# Patient Record
Sex: MALE | Race: WHITE | NOT HISPANIC OR LATINO | Employment: OTHER | URBAN - METROPOLITAN AREA
[De-identification: names, ages, dates, MRNs, and addresses within clinical notes are randomized per-mention and may not be internally consistent; named-entity substitution may affect disease eponyms.]

---

## 2017-01-19 ENCOUNTER — APPOINTMENT (OUTPATIENT)
Dept: LAB | Age: 67
End: 2017-01-19
Payer: COMMERCIAL

## 2017-01-19 ENCOUNTER — TRANSCRIBE ORDERS (OUTPATIENT)
Dept: ADMINISTRATIVE | Age: 67
End: 2017-01-19

## 2017-01-19 DIAGNOSIS — I42.9 SECONDARY CARDIOMYOPATHY, UNSPECIFIED: Primary | ICD-10-CM

## 2017-01-19 DIAGNOSIS — I42.9 SECONDARY CARDIOMYOPATHY, UNSPECIFIED: ICD-10-CM

## 2017-01-19 LAB
ANION GAP SERPL CALCULATED.3IONS-SCNC: 6 MMOL/L (ref 4–13)
BUN SERPL-MCNC: 21 MG/DL (ref 5–25)
CALCIUM SERPL-MCNC: 8.7 MG/DL (ref 8.3–10.1)
CHLORIDE SERPL-SCNC: 107 MMOL/L (ref 100–108)
CO2 SERPL-SCNC: 28 MMOL/L (ref 21–32)
CREAT SERPL-MCNC: 1 MG/DL (ref 0.6–1.3)
GFR SERPL CREATININE-BSD FRML MDRD: >60 ML/MIN/1.73SQ M
GLUCOSE SERPL-MCNC: 102 MG/DL (ref 65–140)
POTASSIUM SERPL-SCNC: 4.1 MMOL/L (ref 3.5–5.3)
SODIUM SERPL-SCNC: 141 MMOL/L (ref 136–145)

## 2017-01-19 PROCEDURE — 36415 COLL VENOUS BLD VENIPUNCTURE: CPT

## 2017-01-19 PROCEDURE — 80048 BASIC METABOLIC PNL TOTAL CA: CPT

## 2017-03-07 ENCOUNTER — GENERIC CONVERSION - ENCOUNTER (OUTPATIENT)
Dept: OTHER | Facility: OTHER | Age: 67
End: 2017-03-07

## 2017-05-22 ENCOUNTER — GENERIC CONVERSION - ENCOUNTER (OUTPATIENT)
Dept: OTHER | Facility: OTHER | Age: 67
End: 2017-05-22

## 2017-05-23 ENCOUNTER — TRANSCRIBE ORDERS (OUTPATIENT)
Dept: ADMINISTRATIVE | Age: 67
End: 2017-05-23

## 2017-05-23 ENCOUNTER — APPOINTMENT (OUTPATIENT)
Dept: LAB | Age: 67
End: 2017-05-23
Payer: COMMERCIAL

## 2017-05-23 DIAGNOSIS — E55.9 UNSPECIFIED VITAMIN D DEFICIENCY: ICD-10-CM

## 2017-05-23 DIAGNOSIS — Z51.81 ENCOUNTER FOR THERAPEUTIC DRUG MONITORING: ICD-10-CM

## 2017-05-23 DIAGNOSIS — Z51.81 ENCOUNTER FOR THERAPEUTIC DRUG MONITORING: Primary | ICD-10-CM

## 2017-05-23 DIAGNOSIS — Z79.899 ENCOUNTER FOR LONG-TERM (CURRENT) USE OF OTHER MEDICATIONS: ICD-10-CM

## 2017-05-23 LAB
25(OH)D3 SERPL-MCNC: 34.9 NG/ML (ref 30–100)
ALBUMIN SERPL BCP-MCNC: 3.8 G/DL (ref 3.5–5)
ALP SERPL-CCNC: 54 U/L (ref 46–116)
ALT SERPL W P-5'-P-CCNC: 23 U/L (ref 12–78)
ANION GAP SERPL CALCULATED.3IONS-SCNC: 6 MMOL/L (ref 4–13)
AST SERPL W P-5'-P-CCNC: 15 U/L (ref 5–45)
BACTERIA UR QL AUTO: NORMAL /HPF
BILIRUB SERPL-MCNC: 1.22 MG/DL (ref 0.2–1)
BILIRUB UR QL STRIP: NEGATIVE
BUN SERPL-MCNC: 18 MG/DL (ref 5–25)
CALCIUM SERPL-MCNC: 9.1 MG/DL (ref 8.3–10.1)
CHLORIDE SERPL-SCNC: 106 MMOL/L (ref 100–108)
CLARITY UR: CLEAR
CO2 SERPL-SCNC: 27 MMOL/L (ref 21–32)
COLOR UR: YELLOW
CREAT SERPL-MCNC: 0.98 MG/DL (ref 0.6–1.3)
DIGOXIN SERPL-MCNC: 0.5 NG/ML (ref 0.8–2)
ERYTHROCYTE [DISTWIDTH] IN BLOOD BY AUTOMATED COUNT: 12.8 % (ref 11.6–15.1)
GFR SERPL CREATININE-BSD FRML MDRD: >60 ML/MIN/1.73SQ M
GLUCOSE P FAST SERPL-MCNC: 92 MG/DL (ref 65–99)
GLUCOSE UR STRIP-MCNC: NEGATIVE MG/DL
HCT VFR BLD AUTO: 42.8 % (ref 36.5–49.3)
HGB BLD-MCNC: 14.3 G/DL (ref 12–17)
HGB UR QL STRIP.AUTO: NEGATIVE
KETONES UR STRIP-MCNC: NEGATIVE MG/DL
LEUKOCYTE ESTERASE UR QL STRIP: NEGATIVE
MAGNESIUM SERPL-MCNC: 2.4 MG/DL (ref 1.6–2.6)
MCH RBC QN AUTO: 30.4 PG (ref 26.8–34.3)
MCHC RBC AUTO-ENTMCNC: 33.4 G/DL (ref 31.4–37.4)
MCV RBC AUTO: 91 FL (ref 82–98)
NITRITE UR QL STRIP: NEGATIVE
NON-SQ EPI CELLS URNS QL MICRO: NORMAL /HPF
PH UR STRIP.AUTO: 6 [PH] (ref 4.5–8)
PLATELET # BLD AUTO: 298 THOUSANDS/UL (ref 149–390)
PMV BLD AUTO: 10.3 FL (ref 8.9–12.7)
POTASSIUM SERPL-SCNC: 4.1 MMOL/L (ref 3.5–5.3)
PROT SERPL-MCNC: 7.5 G/DL (ref 6.4–8.2)
PROT UR STRIP-MCNC: NEGATIVE MG/DL
RBC # BLD AUTO: 4.71 MILLION/UL (ref 3.88–5.62)
RBC #/AREA URNS AUTO: NORMAL /HPF
SODIUM SERPL-SCNC: 139 MMOL/L (ref 136–145)
SP GR UR STRIP.AUTO: 1.02 (ref 1–1.03)
UROBILINOGEN UR QL STRIP.AUTO: 0.2 E.U./DL
WBC # BLD AUTO: 6.95 THOUSAND/UL (ref 4.31–10.16)
WBC #/AREA URNS AUTO: NORMAL /HPF

## 2017-05-23 PROCEDURE — 80162 ASSAY OF DIGOXIN TOTAL: CPT

## 2017-05-23 PROCEDURE — 82306 VITAMIN D 25 HYDROXY: CPT

## 2017-05-23 PROCEDURE — 80053 COMPREHEN METABOLIC PANEL: CPT

## 2017-05-23 PROCEDURE — 81001 URINALYSIS AUTO W/SCOPE: CPT | Performed by: INTERNAL MEDICINE

## 2017-05-23 PROCEDURE — 85027 COMPLETE CBC AUTOMATED: CPT

## 2017-05-23 PROCEDURE — 83735 ASSAY OF MAGNESIUM: CPT

## 2017-05-23 PROCEDURE — 36415 COLL VENOUS BLD VENIPUNCTURE: CPT

## 2017-06-21 ENCOUNTER — TRANSCRIBE ORDERS (OUTPATIENT)
Dept: ADMINISTRATIVE | Facility: HOSPITAL | Age: 67
End: 2017-06-21

## 2017-06-21 ENCOUNTER — ALLSCRIPTS OFFICE VISIT (OUTPATIENT)
Dept: OTHER | Facility: OTHER | Age: 67
End: 2017-06-21

## 2017-06-21 DIAGNOSIS — I49.3 VENTRICULAR PREMATURE DEPOLARIZATION: ICD-10-CM

## 2017-06-21 DIAGNOSIS — I50.9 HEART FAILURE, UNSPECIFIED (HCC): Primary | ICD-10-CM

## 2017-06-21 DIAGNOSIS — I50.9 HEART FAILURE (HCC): ICD-10-CM

## 2017-06-21 DIAGNOSIS — I49.3 VENTRICULAR PREMATURE BEATS: ICD-10-CM

## 2017-06-21 DIAGNOSIS — R00.2 PALPITATIONS: ICD-10-CM

## 2017-06-21 DIAGNOSIS — I47.2 PAROXYSMAL VENTRICULAR TACHYCARDIA (HCC): ICD-10-CM

## 2017-06-21 DIAGNOSIS — I47.2 VENTRICULAR TACHYCARDIA (HCC): ICD-10-CM

## 2017-06-26 DIAGNOSIS — I49.3 VENTRICULAR PREMATURE DEPOLARIZATION: ICD-10-CM

## 2017-06-26 DIAGNOSIS — I10 ESSENTIAL (PRIMARY) HYPERTENSION: ICD-10-CM

## 2017-06-26 DIAGNOSIS — I50.9 HEART FAILURE (HCC): ICD-10-CM

## 2017-06-30 ENCOUNTER — APPOINTMENT (OUTPATIENT)
Dept: LAB | Age: 67
End: 2017-06-30
Payer: COMMERCIAL

## 2017-06-30 ENCOUNTER — TRANSCRIBE ORDERS (OUTPATIENT)
Dept: ADMINISTRATIVE | Age: 67
End: 2017-06-30

## 2017-06-30 DIAGNOSIS — I49.3 VENTRICULAR PREMATURE DEPOLARIZATION: ICD-10-CM

## 2017-06-30 DIAGNOSIS — I50.9 HEART FAILURE (HCC): ICD-10-CM

## 2017-06-30 DIAGNOSIS — I10 ESSENTIAL (PRIMARY) HYPERTENSION: ICD-10-CM

## 2017-06-30 LAB
BUN SERPL-MCNC: 19 MG/DL (ref 5–25)
CREAT SERPL-MCNC: 0.97 MG/DL (ref 0.6–1.3)
GFR SERPL CREATININE-BSD FRML MDRD: >60 ML/MIN/1.73SQ M

## 2017-06-30 PROCEDURE — 36415 COLL VENOUS BLD VENIPUNCTURE: CPT

## 2017-06-30 PROCEDURE — 82565 ASSAY OF CREATININE: CPT

## 2017-06-30 PROCEDURE — 84520 ASSAY OF UREA NITROGEN: CPT

## 2017-07-17 ENCOUNTER — HOSPITAL ENCOUNTER (OUTPATIENT)
Dept: MRI IMAGING | Facility: HOSPITAL | Age: 67
Discharge: HOME | End: 2017-07-17
Attending: INTERNAL MEDICINE
Payer: COMMERCIAL

## 2017-07-17 DIAGNOSIS — R00.2 PALPITATIONS: ICD-10-CM

## 2017-07-17 DIAGNOSIS — I47.2 VENTRICULAR TACHYCARDIA (HCC): ICD-10-CM

## 2017-07-17 DIAGNOSIS — I49.3 VENTRICULAR PREMATURE DEPOLARIZATION: ICD-10-CM

## 2017-07-17 DIAGNOSIS — I50.9 HEART FAILURE (HCC): ICD-10-CM

## 2017-07-17 PROCEDURE — 75561 CARDIAC MRI FOR MORPH W/DYE: CPT

## 2017-07-17 PROCEDURE — A9577 INJ MULTIHANCE: HCPCS | Performed by: INTERNAL MEDICINE

## 2017-07-17 RX ADMIN — GADOBENATE DIMEGLUMINE 15 ML: 529 INJECTION, SOLUTION INTRAVENOUS at 09:40

## 2017-08-03 ENCOUNTER — ALLSCRIPTS OFFICE VISIT (OUTPATIENT)
Dept: OTHER | Facility: OTHER | Age: 67
End: 2017-08-03

## 2018-01-14 VITALS
BODY MASS INDEX: 24.41 KG/M2 | HEIGHT: 72 IN | DIASTOLIC BLOOD PRESSURE: 70 MMHG | WEIGHT: 180.25 LBS | SYSTOLIC BLOOD PRESSURE: 138 MMHG | HEART RATE: 69 BPM

## 2018-01-14 VITALS
WEIGHT: 183.06 LBS | SYSTOLIC BLOOD PRESSURE: 116 MMHG | HEART RATE: 62 BPM | BODY MASS INDEX: 24.79 KG/M2 | DIASTOLIC BLOOD PRESSURE: 68 MMHG | HEIGHT: 72 IN

## 2018-02-12 ENCOUNTER — APPOINTMENT (OUTPATIENT)
Dept: LAB | Age: 68
End: 2018-02-12
Payer: COMMERCIAL

## 2018-02-12 ENCOUNTER — TRANSCRIBE ORDERS (OUTPATIENT)
Dept: ADMINISTRATIVE | Age: 68
End: 2018-02-12

## 2018-02-12 DIAGNOSIS — J45.909 ASTHMATIC BRONCHITIS WITHOUT COMPLICATION, UNSPECIFIED ASTHMA SEVERITY, UNSPECIFIED WHETHER PERSISTENT: ICD-10-CM

## 2018-02-12 DIAGNOSIS — I42.9 FAMILIAL CARDIOMYOPATHY (HCC): Primary | ICD-10-CM

## 2018-02-12 DIAGNOSIS — I42.9 FAMILIAL CARDIOMYOPATHY (HCC): ICD-10-CM

## 2018-02-12 LAB
25(OH)D3 SERPL-MCNC: 32.4 NG/ML (ref 30–100)
ALBUMIN SERPL BCP-MCNC: 4.2 G/DL (ref 3.5–5)
ALP SERPL-CCNC: 48 U/L (ref 46–116)
ALT SERPL W P-5'-P-CCNC: 20 U/L (ref 12–78)
ANION GAP SERPL CALCULATED.3IONS-SCNC: 4 MMOL/L (ref 4–13)
AST SERPL W P-5'-P-CCNC: 14 U/L (ref 5–45)
BILIRUB SERPL-MCNC: 1.13 MG/DL (ref 0.2–1)
BUN SERPL-MCNC: 22 MG/DL (ref 5–25)
CALCIUM SERPL-MCNC: 9.1 MG/DL (ref 8.3–10.1)
CHLORIDE SERPL-SCNC: 102 MMOL/L (ref 100–108)
CO2 SERPL-SCNC: 31 MMOL/L (ref 21–32)
CREAT SERPL-MCNC: 0.88 MG/DL (ref 0.6–1.3)
ERYTHROCYTE [DISTWIDTH] IN BLOOD BY AUTOMATED COUNT: 12.6 % (ref 11.6–15.1)
GFR SERPL CREATININE-BSD FRML MDRD: 90 ML/MIN/1.73SQ M
GLUCOSE P FAST SERPL-MCNC: 88 MG/DL (ref 65–99)
HCT VFR BLD AUTO: 42.9 % (ref 36.5–49.3)
HGB BLD-MCNC: 14.7 G/DL (ref 12–17)
MCH RBC QN AUTO: 31 PG (ref 26.8–34.3)
MCHC RBC AUTO-ENTMCNC: 34.3 G/DL (ref 31.4–37.4)
MCV RBC AUTO: 91 FL (ref 82–98)
PLATELET # BLD AUTO: 278 THOUSANDS/UL (ref 149–390)
PMV BLD AUTO: 9.5 FL (ref 8.9–12.7)
POTASSIUM SERPL-SCNC: 4.3 MMOL/L (ref 3.5–5.3)
PROT SERPL-MCNC: 7.8 G/DL (ref 6.4–8.2)
RBC # BLD AUTO: 4.74 MILLION/UL (ref 3.88–5.62)
SODIUM SERPL-SCNC: 137 MMOL/L (ref 136–145)
WBC # BLD AUTO: 6.66 THOUSAND/UL (ref 4.31–10.16)

## 2018-02-12 PROCEDURE — 80053 COMPREHEN METABOLIC PANEL: CPT

## 2018-02-12 PROCEDURE — 85027 COMPLETE CBC AUTOMATED: CPT

## 2018-02-12 PROCEDURE — 82306 VITAMIN D 25 HYDROXY: CPT

## 2018-02-12 PROCEDURE — 36415 COLL VENOUS BLD VENIPUNCTURE: CPT

## 2018-03-12 ENCOUNTER — APPOINTMENT (OUTPATIENT)
Dept: LAB | Age: 68
End: 2018-03-12
Payer: COMMERCIAL

## 2018-03-12 ENCOUNTER — TRANSCRIBE ORDERS (OUTPATIENT)
Dept: ADMINISTRATIVE | Age: 68
End: 2018-03-12

## 2018-03-12 DIAGNOSIS — I42.9 FAMILIAL CARDIOMYOPATHY (HCC): ICD-10-CM

## 2018-03-12 DIAGNOSIS — I49.3 VENTRICULAR PREMATURE BEATS: Primary | ICD-10-CM

## 2018-03-12 DIAGNOSIS — I49.3 VENTRICULAR PREMATURE BEATS: ICD-10-CM

## 2018-03-12 LAB
ANION GAP SERPL CALCULATED.3IONS-SCNC: 7 MMOL/L (ref 4–13)
BUN SERPL-MCNC: 23 MG/DL (ref 5–25)
CALCIUM SERPL-MCNC: 9 MG/DL (ref 8.3–10.1)
CHLORIDE SERPL-SCNC: 104 MMOL/L (ref 100–108)
CO2 SERPL-SCNC: 29 MMOL/L (ref 21–32)
CREAT SERPL-MCNC: 0.96 MG/DL (ref 0.6–1.3)
GFR SERPL CREATININE-BSD FRML MDRD: 82 ML/MIN/1.73SQ M
GLUCOSE P FAST SERPL-MCNC: 92 MG/DL (ref 65–99)
POTASSIUM SERPL-SCNC: 4.2 MMOL/L (ref 3.5–5.3)
SODIUM SERPL-SCNC: 140 MMOL/L (ref 136–145)

## 2018-03-12 PROCEDURE — 80048 BASIC METABOLIC PNL TOTAL CA: CPT

## 2018-03-12 PROCEDURE — 36415 COLL VENOUS BLD VENIPUNCTURE: CPT

## 2018-09-19 RX ORDER — CALCIUM CARBONATE/VITAMIN D3 500-10/5ML
1 LIQUID (ML) ORAL DAILY
COMMUNITY

## 2018-09-25 ENCOUNTER — OFFICE VISIT (OUTPATIENT)
Dept: CARDIOLOGY CLINIC | Facility: CLINIC | Age: 68
End: 2018-09-25
Payer: MEDICARE

## 2018-09-25 VITALS
SYSTOLIC BLOOD PRESSURE: 106 MMHG | BODY MASS INDEX: 24.16 KG/M2 | WEIGHT: 178.4 LBS | HEART RATE: 63 BPM | DIASTOLIC BLOOD PRESSURE: 70 MMHG | HEIGHT: 72 IN

## 2018-09-25 DIAGNOSIS — I47.2 NSVT (NONSUSTAINED VENTRICULAR TACHYCARDIA) (HCC): Primary | ICD-10-CM

## 2018-09-25 PROCEDURE — 93000 ELECTROCARDIOGRAM COMPLETE: CPT | Performed by: INTERNAL MEDICINE

## 2018-09-25 PROCEDURE — 99213 OFFICE O/P EST LOW 20 MIN: CPT | Performed by: INTERNAL MEDICINE

## 2018-09-25 RX ORDER — ALBUTEROL SULFATE 90 UG/1
1 AEROSOL, METERED RESPIRATORY (INHALATION) EVERY 6 HOURS PRN
COMMUNITY

## 2018-09-25 RX ORDER — FLUTICASONE PROPIONATE 50 MCG
1 SPRAY, SUSPENSION (ML) NASAL DAILY PRN
COMMUNITY

## 2018-09-25 NOTE — PROGRESS NOTES
HEART AND VASCULAR  CARDIAC ELECTROPHYSIOLOGY   HEART RHYTHM CENTER  [facility]    Outpatient  Follow-up  Today's Date: 09/25/18        Patient name: Marcelino Harris  YOB: 1950  Sex: male         Chief Complaint: f/u PVC      ASSESSMENT:  Problem List Items Addressed This Visit     None      Visit Diagnoses     NSVT (nonsustained ventricular tachycardia) ([address] )    -  Primary    Relevant Orders    POCT ECG        80 yo male1) Nonischemic CM EF 15-40% w medical management including entresto and metoprolol 50mg daily  NYHAI-II  2) Frequent PVC1-6% slow NSVT up to 7 beats 150bpm on holter, burden multifocal  SOme appear from Outflow, others from Anterior mitral annulus  Had bigeminy during peak stress on treadmill and did 7 minutes before stopping due to shortness of breath correlating w ectopy  We d/c'd digoxin and increased Metoprolol to 50mg bid  He feels better on this  Repeat Holter showed PVC burden 6% but much less NSVT, longest 3 beats  Repeat stress test also showed much less ectopy and longest NSVT run 3 beats  Cardiac MRI showed EF 40% and no scar  He has started entresto also  PLAN:    1  Continue metoprolol 50mg bid  2  He has another echo and stress test from Dr Vlad Ramirez office we will get records of  Orders Placed This Encounter   Procedures    POCT ECG     Medications Discontinued During This Encounter   Medication Reason    Triamcinolone Acetonide (NASACORT ALLERGY 24HR) 55 MCG/ACT AERO     DIGOXIN PO     lisinopril (ZESTRIL) 20 mg tablet              HPI/Subjective: 80 yo male1) Nonischemic CM EF 15-40% w medical management including entresto and metoprolol 50mg daily  NYHAI-II  2) Frequent PVC1-6% slow NSVT up to 7 beats 150bpm on holter, burden multifocal  SOme appear from Outflow, others from Anterior mitral annulus   Had bigeminy during peak stress on treadmill and did 7 minutes before stopping due to shortness of breath correlating w ectopy  We d/c'd digoxin and increased Metoprolol to 50mg bid  He feels better on this  Repeat Holter showed PVC burden 6% but much less NSVT, longest 3 beats  Repeat stress test also showed much less ectopy and longest NSVT run 3 beats  Cardiac MRI showed EF 40% and no scar  He has started entresto also  Please note HPI is listed by problem with with update following it, it is copied again in the assessment above and reflects medical decision making as well  Complete 12 point ROS reviewed and otherwise non pertinent or negative except as per HPI pertinent positives in Cardiovascular and Respiratory emphasized  Please see paper chart for outpatient clinic patients where the patient completed the 12 point ROS survey  Past Medical History:   Diagnosis Date    Asthma     Heart failure ([facility] [address] )        No Known Allergies  I reviewed the Home Medication list and Allergies in the chart  Scheduled Meds:  Current Outpatient Prescriptions   Medication Sig Dispense Refill    albuterol (PROAIR HFA) 90 mcg/act inhaler 1 puff every 6 (six) hours as needed for wheezing      ascorbic acid (VITAMIN C) 500 mg tablet Take 500 mg by mouth daily   aspirin 81 MG tablet Take 81 mg by mouth daily   budesonide-formoterol (SYMBICORT) 160-4 5 mcg/act inhaler 1 puff daily        Cholecalciferol (VITAMIN D3) 1000 UNITS CAPS Take by mouth   Coenzyme Q10 (CO Q 10) 100 MG CAPS Take by mouth   fluticasone (FLONASE) 50 mcg/act nasal spray 1 spray into each nostril daily as needed for rhinitis      Magnesium Oxide 400 MG CAPS Take 1 capsule by mouth daily      metoprolol succinate (TOPROL-XL) 50 mg 24 hr tablet Take 50 mg by mouth 2 (two) times a day        montelukast (SINGULAIR) 10 mg tablet Take 10 mg by mouth daily at bedtime   Multiple Vitamin (MULTIVITAMIN) tablet Take 1 tablet by mouth daily        oxyCODONE-acetaminophen (PERCOCET) 5-325 mg per tablet Take 2 tablets by mouth every 4 (four) hours as needed for moderate pain for up to 20 doses  Max Daily Amount: 12 tablets 40 tablet 0    sacubitril-valsartan (ENTRESTO)  MG TABS Take 1 tablet by mouth 2 (two) times a day        spironolactone (ALDACTONE) 25 mg tablet Take 25 mg by mouth daily   thiamine (VITAMIN B1) 100 mg tablet Take 100 mg by mouth daily  No current facility-administered medications for this visit  PRN Meds:  No family history on file  Social History     Social History    Marital status: /Civil Union     Spouse name: N/A    Number of children: N/A    Years of education: N/A     Occupational History    Not on file  Social History Main Topics    Smoking status: Former Smoker    Smokeless tobacco: Never Used    Alcohol use No    Drug use: No    Sexual activity: Not on file     Other Topics Concern    Not on file     Social History Narrative    No narrative on file         OBJECTIVE:    /70 (BP Location: Left arm, Patient Position: Sitting, Cuff Size: Standard)   Pulse 63   Ht 6' (1 829 m)   Wt 80 9 kg (178 lb 6 4 oz)   BMI 24 20 kg/m²   Vitals:    09/25/18 1641   Weight: 80 9 kg (178 lb 6 4 oz)     GEN: No acute distress, Alert and oriented, well appearing  HEENT:Head, neck, ears, oral pharynx: Mucus membranes moist, oral pharynx clear, nares clear  External ears normal  EYES: Pupils equal, sclera anicteric, midline, normal conjuctiva  NECK: No JVD, supple, no obvious masses or thryomegaly or goiter  CARDIOVASCULAR:  RRR, No murmur, rub, gallops S1,S2  LUNGS: Clear To auscultation bilaterally, normal effort, no rales, rhonchi, crackles  ABDOMEN:  nondistended,  without obvious organomegaly or ascites  EXTREMITIES/VASCULAR:  No edema  Radial pulses intact, pedal pulses difficult to palpate, warm an well perfused    PSYCH: Normal Affect, no overt suicidal ideation, linear speech pattern without evidence of psychosis  NEURO: Grossly intact, moving all extremiteis equal, face symmetric, alert and responsive, no obvious focal defecits  GAIT:  Ambulates normally without difficulty  HEME: No bleeding, bruising, petechia, purpura  SKIN: No significant rashes, warm, no diaphoresis or pallor  Lab Results:       LABS:      Chemistry        Component Value Date/Time     03/12/2018 0643     04/24/2015 0714    K 4 2 03/12/2018 0643    K 3 8 04/24/2015 0714     03/12/2018 0643     04/24/2015 0714    CO2 29 03/12/2018 0643    CO2 32 04/24/2015 0714    BUN 23 03/12/2018 0643    BUN 21 04/24/2015 0714    CREATININE 0 96 03/12/2018 0643    CREATININE 0 74 04/24/2015 0714        Component Value Date/Time    CALCIUM 9 0 03/12/2018 0643    CALCIUM 9 0 04/24/2015 0714    ALKPHOS 48 02/12/2018 0651    ALKPHOS 76 04/18/2014 0720    AST 14 02/12/2018 0651    AST 16 04/18/2014 0720    ALT 20 02/12/2018 0651    ALT 23 04/18/2014 0720    BILITOT 0 92 04/18/2014 0720            Lab Results   Component Value Date    CHOL 173 04/18/2014     Lab Results   Component Value Date    HDL 43 03/03/2016    HDL 39 04/18/2014     Lab Results   Component Value Date    LDLCALC 117 (H) 03/03/2016    LDLCALC 114 (H) 04/18/2014     Lab Results   Component Value Date    TRIG 124 03/03/2016    TRIG 98 04/18/2014     No components found for: CHOLHDL    IMAGING: No results found  Cardiac testing:   Results for orders placed in visit on 08/02/18   Echo complete with contrast if indicated     No results found for this or any previous visit  No results found for this or any previous visit  No results found for this or any previous visit          I reviewed and interpreted the following LABS/EKG/TELE/IMAGING and below is summary of my interpretation (if data available):      Current EKG and Rhythm Strip: NSR, LAFB, septal infarct pattern QRS 118ms

## 2018-09-28 ENCOUNTER — APPOINTMENT (OUTPATIENT)
Dept: LAB | Age: 68
End: 2018-09-28
Payer: COMMERCIAL

## 2018-09-28 ENCOUNTER — TRANSCRIBE ORDERS (OUTPATIENT)
Dept: ADMINISTRATIVE | Age: 68
End: 2018-09-28

## 2018-09-28 DIAGNOSIS — I49.3 VENTRICULAR PREMATURE BEATS: Primary | ICD-10-CM

## 2018-09-28 DIAGNOSIS — I49.3 VENTRICULAR PREMATURE BEATS: ICD-10-CM

## 2018-09-28 LAB
25(OH)D3 SERPL-MCNC: 54.4 NG/ML (ref 30–100)
ALBUMIN SERPL BCP-MCNC: 3.6 G/DL (ref 3.5–5)
ALP SERPL-CCNC: 49 U/L (ref 46–116)
ALT SERPL W P-5'-P-CCNC: 20 U/L (ref 12–78)
ANION GAP SERPL CALCULATED.3IONS-SCNC: 4 MMOL/L (ref 4–13)
AST SERPL W P-5'-P-CCNC: 14 U/L (ref 5–45)
BACTERIA UR QL AUTO: NORMAL /HPF
BILIRUB SERPL-MCNC: 0.99 MG/DL (ref 0.2–1)
BILIRUB UR QL STRIP: NEGATIVE
BUN SERPL-MCNC: 22 MG/DL (ref 5–25)
CALCIUM SERPL-MCNC: 8.8 MG/DL (ref 8.3–10.1)
CHLORIDE SERPL-SCNC: 104 MMOL/L (ref 100–108)
CLARITY UR: CLEAR
CO2 SERPL-SCNC: 29 MMOL/L (ref 21–32)
COLOR UR: NORMAL
CREAT SERPL-MCNC: 0.95 MG/DL (ref 0.6–1.3)
ERYTHROCYTE [DISTWIDTH] IN BLOOD BY AUTOMATED COUNT: 12.8 % (ref 11.6–15.1)
GFR SERPL CREATININE-BSD FRML MDRD: 83 ML/MIN/1.73SQ M
GLUCOSE P FAST SERPL-MCNC: 86 MG/DL (ref 65–99)
GLUCOSE UR STRIP-MCNC: NEGATIVE MG/DL
HCT VFR BLD AUTO: 42.3 % (ref 36.5–49.3)
HGB BLD-MCNC: 13.9 G/DL (ref 12–17)
HGB UR QL STRIP.AUTO: NEGATIVE
KETONES UR STRIP-MCNC: NEGATIVE MG/DL
LDLC SERPL DIRECT ASSAY-MCNC: 115 MG/DL (ref 0–100)
LEUKOCYTE ESTERASE UR QL STRIP: NEGATIVE
MCH RBC QN AUTO: 30.8 PG (ref 26.8–34.3)
MCHC RBC AUTO-ENTMCNC: 32.9 G/DL (ref 31.4–37.4)
MCV RBC AUTO: 94 FL (ref 82–98)
NITRITE UR QL STRIP: NEGATIVE
NON-SQ EPI CELLS URNS QL MICRO: NORMAL /HPF
PH UR STRIP.AUTO: 7 [PH] (ref 4.5–8)
PLATELET # BLD AUTO: 315 THOUSANDS/UL (ref 149–390)
PMV BLD AUTO: 9.4 FL (ref 8.9–12.7)
POTASSIUM SERPL-SCNC: 4.6 MMOL/L (ref 3.5–5.3)
PROT SERPL-MCNC: 7.4 G/DL (ref 6.4–8.2)
PROT UR STRIP-MCNC: NEGATIVE MG/DL
RBC # BLD AUTO: 4.52 MILLION/UL (ref 3.88–5.62)
RBC #/AREA URNS AUTO: NORMAL /HPF
SODIUM SERPL-SCNC: 137 MMOL/L (ref 136–145)
SP GR UR STRIP.AUTO: 1.02 (ref 1–1.03)
TSH SERPL DL<=0.05 MIU/L-ACNC: 0.82 UIU/ML (ref 0.36–3.74)
UROBILINOGEN UR QL STRIP.AUTO: 0.2 E.U./DL
WBC # BLD AUTO: 7.05 THOUSAND/UL (ref 4.31–10.16)
WBC #/AREA URNS AUTO: NORMAL /HPF

## 2018-09-28 PROCEDURE — 84443 ASSAY THYROID STIM HORMONE: CPT

## 2018-09-28 PROCEDURE — 36415 COLL VENOUS BLD VENIPUNCTURE: CPT

## 2018-09-28 PROCEDURE — 82306 VITAMIN D 25 HYDROXY: CPT

## 2018-09-28 PROCEDURE — 81001 URINALYSIS AUTO W/SCOPE: CPT | Performed by: INTERNAL MEDICINE

## 2018-09-28 PROCEDURE — 80053 COMPREHEN METABOLIC PANEL: CPT

## 2018-09-28 PROCEDURE — 85027 COMPLETE CBC AUTOMATED: CPT

## 2018-09-28 PROCEDURE — 83721 ASSAY OF BLOOD LIPOPROTEIN: CPT

## 2019-05-03 ENCOUNTER — APPOINTMENT (OUTPATIENT)
Dept: LAB | Age: 69
End: 2019-05-03
Payer: COMMERCIAL

## 2019-05-03 ENCOUNTER — TRANSCRIBE ORDERS (OUTPATIENT)
Dept: ADMINISTRATIVE | Age: 69
End: 2019-05-03

## 2019-05-03 DIAGNOSIS — Z12.5 SPECIAL SCREENING FOR MALIGNANT NEOPLASM OF PROSTATE: ICD-10-CM

## 2019-05-03 DIAGNOSIS — I42.9 FAMILIAL CARDIOMYOPATHY (HCC): Primary | ICD-10-CM

## 2019-05-03 DIAGNOSIS — I42.9 FAMILIAL CARDIOMYOPATHY (HCC): ICD-10-CM

## 2019-05-03 LAB
ALBUMIN SERPL BCP-MCNC: 4.1 G/DL (ref 3.5–5)
ALP SERPL-CCNC: 52 U/L (ref 46–116)
ALT SERPL W P-5'-P-CCNC: 22 U/L (ref 12–78)
ANION GAP SERPL CALCULATED.3IONS-SCNC: 2 MMOL/L (ref 4–13)
AST SERPL W P-5'-P-CCNC: 12 U/L (ref 5–45)
BILIRUB SERPL-MCNC: 1.41 MG/DL (ref 0.2–1)
BUN SERPL-MCNC: 25 MG/DL (ref 5–25)
CALCIUM SERPL-MCNC: 9.2 MG/DL (ref 8.3–10.1)
CHLORIDE SERPL-SCNC: 104 MMOL/L (ref 100–108)
CO2 SERPL-SCNC: 30 MMOL/L (ref 21–32)
CREAT SERPL-MCNC: 1 MG/DL (ref 0.6–1.3)
GFR SERPL CREATININE-BSD FRML MDRD: 78 ML/MIN/1.73SQ M
GLUCOSE P FAST SERPL-MCNC: 95 MG/DL (ref 65–99)
MAGNESIUM SERPL-MCNC: 2.4 MG/DL (ref 1.6–2.6)
POTASSIUM SERPL-SCNC: 4.5 MMOL/L (ref 3.5–5.3)
PROT SERPL-MCNC: 8 G/DL (ref 6.4–8.2)
PSA SERPL-MCNC: 0.5 NG/ML (ref 0–4)
SODIUM SERPL-SCNC: 136 MMOL/L (ref 136–145)

## 2019-05-03 PROCEDURE — 80053 COMPREHEN METABOLIC PANEL: CPT

## 2019-05-03 PROCEDURE — G0103 PSA SCREENING: HCPCS

## 2019-05-03 PROCEDURE — 36415 COLL VENOUS BLD VENIPUNCTURE: CPT

## 2019-05-03 PROCEDURE — 83735 ASSAY OF MAGNESIUM: CPT

## 2020-02-17 ENCOUNTER — APPOINTMENT (OUTPATIENT)
Dept: LAB | Age: 70
End: 2020-02-17
Payer: COMMERCIAL

## 2020-02-17 ENCOUNTER — TRANSCRIBE ORDERS (OUTPATIENT)
Dept: ADMINISTRATIVE | Age: 70
End: 2020-02-17

## 2020-02-17 DIAGNOSIS — R35.1 NOCTURIA: ICD-10-CM

## 2020-02-17 DIAGNOSIS — R35.1 NOCTURIA: Primary | ICD-10-CM

## 2020-02-17 DIAGNOSIS — I49.3 VENTRICULAR PREMATURE BEATS: ICD-10-CM

## 2020-02-17 LAB
25(OH)D3 SERPL-MCNC: 44.2 NG/ML (ref 30–100)
ALBUMIN SERPL BCP-MCNC: 4 G/DL (ref 3.5–5)
ALP SERPL-CCNC: 50 U/L (ref 46–116)
ALT SERPL W P-5'-P-CCNC: 18 U/L (ref 12–78)
ANION GAP SERPL CALCULATED.3IONS-SCNC: 7 MMOL/L (ref 4–13)
AST SERPL W P-5'-P-CCNC: 12 U/L (ref 5–45)
BACTERIA UR QL AUTO: ABNORMAL /HPF
BASOPHILS # BLD AUTO: 0.08 THOUSANDS/ΜL (ref 0–0.1)
BASOPHILS NFR BLD AUTO: 1 % (ref 0–1)
BILIRUB SERPL-MCNC: 1.71 MG/DL (ref 0.2–1)
BILIRUB UR QL STRIP: NEGATIVE
BUN SERPL-MCNC: 29 MG/DL (ref 5–25)
CALCIUM SERPL-MCNC: 9.3 MG/DL (ref 8.3–10.1)
CHLORIDE SERPL-SCNC: 107 MMOL/L (ref 100–108)
CHOLEST SERPL-MCNC: 208 MG/DL (ref 50–200)
CLARITY UR: CLEAR
CO2 SERPL-SCNC: 27 MMOL/L (ref 21–32)
COLOR UR: YELLOW
CREAT SERPL-MCNC: 1 MG/DL (ref 0.6–1.3)
EOSINOPHIL # BLD AUTO: 0.25 THOUSAND/ΜL (ref 0–0.61)
EOSINOPHIL NFR BLD AUTO: 4 % (ref 0–6)
ERYTHROCYTE [DISTWIDTH] IN BLOOD BY AUTOMATED COUNT: 12.8 % (ref 11.6–15.1)
GFR SERPL CREATININE-BSD FRML MDRD: 77 ML/MIN/1.73SQ M
GLUCOSE P FAST SERPL-MCNC: 94 MG/DL (ref 65–99)
GLUCOSE UR STRIP-MCNC: NEGATIVE MG/DL
HCT VFR BLD AUTO: 43 % (ref 36.5–49.3)
HDLC SERPL-MCNC: 49 MG/DL
HGB BLD-MCNC: 14 G/DL (ref 12–17)
HGB UR QL STRIP.AUTO: NEGATIVE
IMM GRANULOCYTES # BLD AUTO: 0.01 THOUSAND/UL (ref 0–0.2)
IMM GRANULOCYTES NFR BLD AUTO: 0 % (ref 0–2)
KETONES UR STRIP-MCNC: NEGATIVE MG/DL
LDLC SERPL CALC-MCNC: 140 MG/DL (ref 0–100)
LEUKOCYTE ESTERASE UR QL STRIP: NEGATIVE
LYMPHOCYTES # BLD AUTO: 1.82 THOUSANDS/ΜL (ref 0.6–4.47)
LYMPHOCYTES NFR BLD AUTO: 31 % (ref 14–44)
MAGNESIUM SERPL-MCNC: 2.5 MG/DL (ref 1.6–2.6)
MCH RBC QN AUTO: 30.4 PG (ref 26.8–34.3)
MCHC RBC AUTO-ENTMCNC: 32.6 G/DL (ref 31.4–37.4)
MCV RBC AUTO: 93 FL (ref 82–98)
MONOCYTES # BLD AUTO: 0.75 THOUSAND/ΜL (ref 0.17–1.22)
MONOCYTES NFR BLD AUTO: 13 % (ref 4–12)
MUCOUS THREADS UR QL AUTO: ABNORMAL
NEUTROPHILS # BLD AUTO: 3 THOUSANDS/ΜL (ref 1.85–7.62)
NEUTS SEG NFR BLD AUTO: 51 % (ref 43–75)
NITRITE UR QL STRIP: NEGATIVE
NON-SQ EPI CELLS URNS QL MICRO: ABNORMAL /HPF
NONHDLC SERPL-MCNC: 159 MG/DL
NRBC BLD AUTO-RTO: 0 /100 WBCS
PH UR STRIP.AUTO: 6 [PH]
PLATELET # BLD AUTO: 311 THOUSANDS/UL (ref 149–390)
PMV BLD AUTO: 9.4 FL (ref 8.9–12.7)
POTASSIUM SERPL-SCNC: 4.1 MMOL/L (ref 3.5–5.3)
PROT SERPL-MCNC: 7.7 G/DL (ref 6.4–8.2)
PROT UR STRIP-MCNC: NEGATIVE MG/DL
RBC # BLD AUTO: 4.61 MILLION/UL (ref 3.88–5.62)
RBC #/AREA URNS AUTO: ABNORMAL /HPF
SODIUM SERPL-SCNC: 141 MMOL/L (ref 136–145)
SP GR UR STRIP.AUTO: 1.02 (ref 1–1.03)
TRIGL SERPL-MCNC: 97 MG/DL
UROBILINOGEN UR QL STRIP.AUTO: 0.2 E.U./DL
WBC # BLD AUTO: 5.91 THOUSAND/UL (ref 4.31–10.16)
WBC #/AREA URNS AUTO: ABNORMAL /HPF

## 2020-02-17 PROCEDURE — 81001 URINALYSIS AUTO W/SCOPE: CPT | Performed by: INTERNAL MEDICINE

## 2020-02-17 PROCEDURE — 83735 ASSAY OF MAGNESIUM: CPT

## 2020-02-17 PROCEDURE — 80061 LIPID PANEL: CPT

## 2020-02-17 PROCEDURE — 82306 VITAMIN D 25 HYDROXY: CPT

## 2020-02-17 PROCEDURE — 36415 COLL VENOUS BLD VENIPUNCTURE: CPT

## 2020-02-17 PROCEDURE — 85025 COMPLETE CBC W/AUTO DIFF WBC: CPT

## 2020-02-17 PROCEDURE — 84154 ASSAY OF PSA FREE: CPT

## 2020-02-17 PROCEDURE — 84153 ASSAY OF PSA TOTAL: CPT

## 2020-02-17 PROCEDURE — 80053 COMPREHEN METABOLIC PANEL: CPT

## 2020-02-18 LAB
PSA FREE MFR SERPL: 43.3 %
PSA FREE SERPL-MCNC: 0.26 NG/ML
PSA SERPL-MCNC: 0.6 NG/ML (ref 0–4)

## 2020-08-13 ENCOUNTER — APPOINTMENT (OUTPATIENT)
Dept: LAB | Age: 70
End: 2020-08-13
Payer: COMMERCIAL

## 2020-08-13 ENCOUNTER — TRANSCRIBE ORDERS (OUTPATIENT)
Dept: ADMINISTRATIVE | Age: 70
End: 2020-08-13

## 2020-08-13 DIAGNOSIS — I42.9 FAMILIAL CARDIOMYOPATHY (HCC): ICD-10-CM

## 2020-08-13 DIAGNOSIS — E78.5 HYPERLIPIDEMIA, UNSPECIFIED HYPERLIPIDEMIA TYPE: ICD-10-CM

## 2020-08-13 DIAGNOSIS — J45.909 ASTHMATIC BRONCHITIS WITHOUT COMPLICATION, UNSPECIFIED ASTHMA SEVERITY, UNSPECIFIED WHETHER PERSISTENT: ICD-10-CM

## 2020-08-13 DIAGNOSIS — I42.9 FAMILIAL CARDIOMYOPATHY (HCC): Primary | ICD-10-CM

## 2020-08-13 LAB
ALBUMIN SERPL BCP-MCNC: 4 G/DL (ref 3.5–5)
ALP SERPL-CCNC: 45 U/L (ref 46–116)
ALT SERPL W P-5'-P-CCNC: 24 U/L (ref 12–78)
ANION GAP SERPL CALCULATED.3IONS-SCNC: 5 MMOL/L (ref 4–13)
AST SERPL W P-5'-P-CCNC: 17 U/L (ref 5–45)
BILIRUB SERPL-MCNC: 1.51 MG/DL (ref 0.2–1)
BUN SERPL-MCNC: 20 MG/DL (ref 5–25)
CALCIUM SERPL-MCNC: 9.2 MG/DL (ref 8.3–10.1)
CHLORIDE SERPL-SCNC: 106 MMOL/L (ref 100–108)
CHOLEST SERPL-MCNC: 180 MG/DL (ref 50–200)
CO2 SERPL-SCNC: 29 MMOL/L (ref 21–32)
CREAT SERPL-MCNC: 0.93 MG/DL (ref 0.6–1.3)
ERYTHROCYTE [DISTWIDTH] IN BLOOD BY AUTOMATED COUNT: 12.6 % (ref 11.6–15.1)
GFR SERPL CREATININE-BSD FRML MDRD: 84 ML/MIN/1.73SQ M
GLUCOSE P FAST SERPL-MCNC: 97 MG/DL (ref 65–99)
HCT VFR BLD AUTO: 43.4 % (ref 36.5–49.3)
HDLC SERPL-MCNC: 53 MG/DL
HGB BLD-MCNC: 13.9 G/DL (ref 12–17)
LDLC SERPL CALC-MCNC: 112 MG/DL (ref 0–100)
MCH RBC QN AUTO: 30.8 PG (ref 26.8–34.3)
MCHC RBC AUTO-ENTMCNC: 32 G/DL (ref 31.4–37.4)
MCV RBC AUTO: 96 FL (ref 82–98)
NONHDLC SERPL-MCNC: 127 MG/DL
PLATELET # BLD AUTO: 289 THOUSANDS/UL (ref 149–390)
PMV BLD AUTO: 9.8 FL (ref 8.9–12.7)
POTASSIUM SERPL-SCNC: 4 MMOL/L (ref 3.5–5.3)
PROT SERPL-MCNC: 7.6 G/DL (ref 6.4–8.2)
RBC # BLD AUTO: 4.52 MILLION/UL (ref 3.88–5.62)
SODIUM SERPL-SCNC: 140 MMOL/L (ref 136–145)
TRIGL SERPL-MCNC: 75 MG/DL
WBC # BLD AUTO: 6.06 THOUSAND/UL (ref 4.31–10.16)

## 2020-08-13 PROCEDURE — 36415 COLL VENOUS BLD VENIPUNCTURE: CPT

## 2020-08-13 PROCEDURE — 80061 LIPID PANEL: CPT

## 2020-08-13 PROCEDURE — 80053 COMPREHEN METABOLIC PANEL: CPT

## 2020-08-13 PROCEDURE — 85027 COMPLETE CBC AUTOMATED: CPT

## 2021-03-01 ENCOUNTER — TRANSCRIBE ORDERS (OUTPATIENT)
Dept: ADMINISTRATIVE | Age: 71
End: 2021-03-01

## 2021-03-01 ENCOUNTER — LAB (OUTPATIENT)
Dept: LAB | Age: 71
End: 2021-03-01
Payer: COMMERCIAL

## 2021-03-01 DIAGNOSIS — I47.2 PAROXYSMAL VENTRICULAR TACHYCARDIA (HCC): ICD-10-CM

## 2021-03-01 DIAGNOSIS — I47.2 PAROXYSMAL VENTRICULAR TACHYCARDIA (HCC): Primary | ICD-10-CM

## 2021-03-01 DIAGNOSIS — Z00.8 HEALTH EXAMINATION IN POPULATION SURVEY: ICD-10-CM

## 2021-03-01 LAB
ALBUMIN SERPL BCP-MCNC: 4.1 G/DL (ref 3.5–5)
ALP SERPL-CCNC: 47 U/L (ref 46–116)
ALT SERPL W P-5'-P-CCNC: 18 U/L (ref 12–78)
ANION GAP SERPL CALCULATED.3IONS-SCNC: 4 MMOL/L (ref 4–13)
AST SERPL W P-5'-P-CCNC: 11 U/L (ref 5–45)
BACTERIA UR QL AUTO: NORMAL /HPF
BASOPHILS # BLD AUTO: 0.06 THOUSANDS/ΜL (ref 0–0.1)
BASOPHILS NFR BLD AUTO: 1 % (ref 0–1)
BILIRUB SERPL-MCNC: 0.98 MG/DL (ref 0.2–1)
BILIRUB UR QL STRIP: NEGATIVE
BUN SERPL-MCNC: 23 MG/DL (ref 5–25)
CALCIUM SERPL-MCNC: 9.5 MG/DL (ref 8.3–10.1)
CHLORIDE SERPL-SCNC: 111 MMOL/L (ref 100–108)
CLARITY UR: CLEAR
CO2 SERPL-SCNC: 27 MMOL/L (ref 21–32)
COLOR UR: YELLOW
CREAT SERPL-MCNC: 0.89 MG/DL (ref 0.6–1.3)
EOSINOPHIL # BLD AUTO: 0.32 THOUSAND/ΜL (ref 0–0.61)
EOSINOPHIL NFR BLD AUTO: 4 % (ref 0–6)
ERYTHROCYTE [DISTWIDTH] IN BLOOD BY AUTOMATED COUNT: 12.3 % (ref 11.6–15.1)
GFR SERPL CREATININE-BSD FRML MDRD: 87 ML/MIN/1.73SQ M
GLUCOSE P FAST SERPL-MCNC: 100 MG/DL (ref 65–99)
GLUCOSE UR STRIP-MCNC: NEGATIVE MG/DL
HCT VFR BLD AUTO: 43.7 % (ref 36.5–49.3)
HGB BLD-MCNC: 14.3 G/DL (ref 12–17)
HGB UR QL STRIP.AUTO: NEGATIVE
IMM GRANULOCYTES # BLD AUTO: 0.01 THOUSAND/UL (ref 0–0.2)
IMM GRANULOCYTES NFR BLD AUTO: 0 % (ref 0–2)
KETONES UR STRIP-MCNC: NEGATIVE MG/DL
LEUKOCYTE ESTERASE UR QL STRIP: NEGATIVE
LYMPHOCYTES # BLD AUTO: 1.55 THOUSANDS/ΜL (ref 0.6–4.47)
LYMPHOCYTES NFR BLD AUTO: 21 % (ref 14–44)
MAGNESIUM SERPL-MCNC: 2.4 MG/DL (ref 1.6–2.6)
MCH RBC QN AUTO: 31.2 PG (ref 26.8–34.3)
MCHC RBC AUTO-ENTMCNC: 32.7 G/DL (ref 31.4–37.4)
MCV RBC AUTO: 95 FL (ref 82–98)
MONOCYTES # BLD AUTO: 0.83 THOUSAND/ΜL (ref 0.17–1.22)
MONOCYTES NFR BLD AUTO: 11 % (ref 4–12)
NEUTROPHILS # BLD AUTO: 4.59 THOUSANDS/ΜL (ref 1.85–7.62)
NEUTS SEG NFR BLD AUTO: 63 % (ref 43–75)
NITRITE UR QL STRIP: NEGATIVE
NON-SQ EPI CELLS URNS QL MICRO: NORMAL /HPF
NRBC BLD AUTO-RTO: 0 /100 WBCS
PH UR STRIP.AUTO: 6 [PH]
PLATELET # BLD AUTO: 272 THOUSANDS/UL (ref 149–390)
PMV BLD AUTO: 10.1 FL (ref 8.9–12.7)
POTASSIUM SERPL-SCNC: 4.1 MMOL/L (ref 3.5–5.3)
PROT SERPL-MCNC: 7.4 G/DL (ref 6.4–8.2)
PROT UR STRIP-MCNC: NEGATIVE MG/DL
RBC # BLD AUTO: 4.59 MILLION/UL (ref 3.88–5.62)
RBC #/AREA URNS AUTO: NORMAL /HPF
SODIUM SERPL-SCNC: 142 MMOL/L (ref 136–145)
SP GR UR STRIP.AUTO: 1.02 (ref 1–1.03)
UROBILINOGEN UR QL STRIP.AUTO: 0.2 E.U./DL
WBC # BLD AUTO: 7.36 THOUSAND/UL (ref 4.31–10.16)
WBC #/AREA URNS AUTO: NORMAL /HPF

## 2021-03-01 PROCEDURE — 83735 ASSAY OF MAGNESIUM: CPT

## 2021-03-01 PROCEDURE — 80053 COMPREHEN METABOLIC PANEL: CPT

## 2021-03-01 PROCEDURE — 36415 COLL VENOUS BLD VENIPUNCTURE: CPT

## 2021-03-01 PROCEDURE — 85025 COMPLETE CBC W/AUTO DIFF WBC: CPT

## 2021-03-01 PROCEDURE — 81001 URINALYSIS AUTO W/SCOPE: CPT | Performed by: INTERNAL MEDICINE

## 2021-09-07 ENCOUNTER — APPOINTMENT (OUTPATIENT)
Dept: LAB | Age: 71
End: 2021-09-07
Payer: COMMERCIAL

## 2021-09-07 DIAGNOSIS — Z00.8 HEALTH EXAMINATION IN POPULATION SURVEY: ICD-10-CM

## 2021-09-07 DIAGNOSIS — I49.3 VENTRICULAR PREMATURE BEATS: ICD-10-CM

## 2021-09-07 DIAGNOSIS — Z79.899 ENCOUNTER FOR LONG-TERM (CURRENT) USE OF OTHER MEDICATIONS: ICD-10-CM

## 2021-09-07 LAB
ALBUMIN SERPL BCP-MCNC: 3.5 G/DL (ref 3.5–5)
ALP SERPL-CCNC: 58 U/L (ref 46–116)
ALT SERPL W P-5'-P-CCNC: 20 U/L (ref 12–78)
ANION GAP SERPL CALCULATED.3IONS-SCNC: 5 MMOL/L (ref 4–13)
AST SERPL W P-5'-P-CCNC: 18 U/L (ref 5–45)
BASOPHILS # BLD AUTO: 0.07 THOUSANDS/ΜL (ref 0–0.1)
BASOPHILS NFR BLD AUTO: 1 % (ref 0–1)
BILIRUB SERPL-MCNC: 0.91 MG/DL (ref 0.2–1)
BUN SERPL-MCNC: 23 MG/DL (ref 5–25)
CALCIUM SERPL-MCNC: 9.3 MG/DL (ref 8.3–10.1)
CHLORIDE SERPL-SCNC: 106 MMOL/L (ref 100–108)
CHOLEST SERPL-MCNC: 151 MG/DL (ref 50–200)
CO2 SERPL-SCNC: 27 MMOL/L (ref 21–32)
CREAT SERPL-MCNC: 0.86 MG/DL (ref 0.6–1.3)
EOSINOPHIL # BLD AUTO: 0.21 THOUSAND/ΜL (ref 0–0.61)
EOSINOPHIL NFR BLD AUTO: 4 % (ref 0–6)
ERYTHROCYTE [DISTWIDTH] IN BLOOD BY AUTOMATED COUNT: 13 % (ref 11.6–15.1)
EST. AVERAGE GLUCOSE BLD GHB EST-MCNC: 117 MG/DL
GFR SERPL CREATININE-BSD FRML MDRD: 88 ML/MIN/1.73SQ M
GLUCOSE P FAST SERPL-MCNC: 88 MG/DL (ref 65–99)
HBA1C MFR BLD: 5.7 %
HCT VFR BLD AUTO: 41.1 % (ref 36.5–49.3)
HDLC SERPL-MCNC: 40 MG/DL
HGB BLD-MCNC: 13.3 G/DL (ref 12–17)
IMM GRANULOCYTES # BLD AUTO: 0.01 THOUSAND/UL (ref 0–0.2)
IMM GRANULOCYTES NFR BLD AUTO: 0 % (ref 0–2)
LDLC SERPL CALC-MCNC: 93 MG/DL (ref 0–100)
LYMPHOCYTES # BLD AUTO: 1.48 THOUSANDS/ΜL (ref 0.6–4.47)
LYMPHOCYTES NFR BLD AUTO: 26 % (ref 14–44)
MCH RBC QN AUTO: 30.8 PG (ref 26.8–34.3)
MCHC RBC AUTO-ENTMCNC: 32.4 G/DL (ref 31.4–37.4)
MCV RBC AUTO: 95 FL (ref 82–98)
MONOCYTES # BLD AUTO: 0.72 THOUSAND/ΜL (ref 0.17–1.22)
MONOCYTES NFR BLD AUTO: 13 % (ref 4–12)
NEUTROPHILS # BLD AUTO: 3.19 THOUSANDS/ΜL (ref 1.85–7.62)
NEUTS SEG NFR BLD AUTO: 56 % (ref 43–75)
NONHDLC SERPL-MCNC: 111 MG/DL
NRBC BLD AUTO-RTO: 0 /100 WBCS
PLATELET # BLD AUTO: 372 THOUSANDS/UL (ref 149–390)
PMV BLD AUTO: 9.4 FL (ref 8.9–12.7)
POTASSIUM SERPL-SCNC: 4.1 MMOL/L (ref 3.5–5.3)
PROT SERPL-MCNC: 7.8 G/DL (ref 6.4–8.2)
RBC # BLD AUTO: 4.32 MILLION/UL (ref 3.88–5.62)
SODIUM SERPL-SCNC: 138 MMOL/L (ref 136–145)
TRIGL SERPL-MCNC: 90 MG/DL
TSH SERPL DL<=0.05 MIU/L-ACNC: 1.01 UIU/ML (ref 0.36–3.74)
WBC # BLD AUTO: 5.68 THOUSAND/UL (ref 4.31–10.16)

## 2021-09-07 PROCEDURE — 80061 LIPID PANEL: CPT

## 2021-09-07 PROCEDURE — 83036 HEMOGLOBIN GLYCOSYLATED A1C: CPT

## 2021-09-07 PROCEDURE — 84443 ASSAY THYROID STIM HORMONE: CPT

## 2021-09-07 PROCEDURE — 85025 COMPLETE CBC W/AUTO DIFF WBC: CPT

## 2021-09-07 PROCEDURE — 80053 COMPREHEN METABOLIC PANEL: CPT

## 2021-09-07 PROCEDURE — 36415 COLL VENOUS BLD VENIPUNCTURE: CPT

## 2021-09-30 ENCOUNTER — APPOINTMENT (OUTPATIENT)
Dept: LAB | Age: 71
End: 2021-09-30
Payer: COMMERCIAL

## 2021-09-30 DIAGNOSIS — Z79.899 ENCOUNTER FOR LONG-TERM (CURRENT) USE OF OTHER MEDICATIONS: ICD-10-CM

## 2021-09-30 DIAGNOSIS — Z00.8 HEALTH EXAMINATION IN POPULATION SURVEY: ICD-10-CM

## 2021-09-30 DIAGNOSIS — I49.3 VENTRICULAR PREMATURE BEATS: ICD-10-CM

## 2021-09-30 LAB
ANION GAP SERPL CALCULATED.3IONS-SCNC: 4 MMOL/L (ref 4–13)
BUN SERPL-MCNC: 20 MG/DL (ref 5–25)
CALCIUM SERPL-MCNC: 9.8 MG/DL (ref 8.3–10.1)
CHLORIDE SERPL-SCNC: 108 MMOL/L (ref 100–108)
CO2 SERPL-SCNC: 25 MMOL/L (ref 21–32)
CREAT SERPL-MCNC: 0.98 MG/DL (ref 0.6–1.3)
GFR SERPL CREATININE-BSD FRML MDRD: 78 ML/MIN/1.73SQ M
GLUCOSE P FAST SERPL-MCNC: 93 MG/DL (ref 65–99)
POTASSIUM SERPL-SCNC: 4.5 MMOL/L (ref 3.5–5.3)
SODIUM SERPL-SCNC: 137 MMOL/L (ref 136–145)

## 2021-09-30 PROCEDURE — 36415 COLL VENOUS BLD VENIPUNCTURE: CPT

## 2021-09-30 PROCEDURE — 80048 BASIC METABOLIC PNL TOTAL CA: CPT

## 2021-11-11 ENCOUNTER — APPOINTMENT (OUTPATIENT)
Dept: LAB | Age: 71
End: 2021-11-11
Payer: COMMERCIAL

## 2021-11-11 DIAGNOSIS — Z79.899 ENCOUNTER FOR LONG-TERM (CURRENT) USE OF OTHER MEDICATIONS: ICD-10-CM

## 2021-11-11 LAB
ANION GAP SERPL CALCULATED.3IONS-SCNC: 9 MMOL/L (ref 4–13)
BUN SERPL-MCNC: 20 MG/DL (ref 5–25)
CALCIUM SERPL-MCNC: 9.3 MG/DL (ref 8.3–10.1)
CHLORIDE SERPL-SCNC: 107 MMOL/L (ref 100–108)
CO2 SERPL-SCNC: 27 MMOL/L (ref 21–32)
CREAT SERPL-MCNC: 0.96 MG/DL (ref 0.6–1.3)
GFR SERPL CREATININE-BSD FRML MDRD: 80 ML/MIN/1.73SQ M
GLUCOSE P FAST SERPL-MCNC: 96 MG/DL (ref 65–99)
POTASSIUM SERPL-SCNC: 4.6 MMOL/L (ref 3.5–5.3)
SODIUM SERPL-SCNC: 143 MMOL/L (ref 136–145)

## 2021-11-11 PROCEDURE — 80048 BASIC METABOLIC PNL TOTAL CA: CPT

## 2021-11-11 PROCEDURE — 36415 COLL VENOUS BLD VENIPUNCTURE: CPT

## 2022-03-08 ENCOUNTER — APPOINTMENT (OUTPATIENT)
Dept: LAB | Age: 72
End: 2022-03-08
Payer: COMMERCIAL

## 2022-03-08 DIAGNOSIS — Z79.899 ENCOUNTER FOR LONG-TERM (CURRENT) USE OF OTHER MEDICATIONS: ICD-10-CM

## 2022-03-08 DIAGNOSIS — E55.9 AVITAMINOSIS D: ICD-10-CM

## 2022-03-08 LAB
25(OH)D3 SERPL-MCNC: 54.6 NG/ML (ref 30–100)
ALBUMIN SERPL BCP-MCNC: 4.1 G/DL (ref 3.5–5)
ALP SERPL-CCNC: 44 U/L (ref 46–116)
ALT SERPL W P-5'-P-CCNC: 16 U/L (ref 12–78)
ANION GAP SERPL CALCULATED.3IONS-SCNC: 5 MMOL/L (ref 4–13)
AST SERPL W P-5'-P-CCNC: 12 U/L (ref 5–45)
BASOPHILS # BLD AUTO: 0.08 THOUSANDS/ΜL (ref 0–0.1)
BASOPHILS NFR BLD AUTO: 1 % (ref 0–1)
BILIRUB SERPL-MCNC: 1.76 MG/DL (ref 0.2–1)
BUN SERPL-MCNC: 24 MG/DL (ref 5–25)
CALCIUM SERPL-MCNC: 9.3 MG/DL (ref 8.3–10.1)
CHLORIDE SERPL-SCNC: 109 MMOL/L (ref 100–108)
CO2 SERPL-SCNC: 26 MMOL/L (ref 21–32)
CREAT SERPL-MCNC: 0.99 MG/DL (ref 0.6–1.3)
EOSINOPHIL # BLD AUTO: 0.26 THOUSAND/ΜL (ref 0–0.61)
EOSINOPHIL NFR BLD AUTO: 5 % (ref 0–6)
ERYTHROCYTE [DISTWIDTH] IN BLOOD BY AUTOMATED COUNT: 13 % (ref 11.6–15.1)
GFR SERPL CREATININE-BSD FRML MDRD: 76 ML/MIN/1.73SQ M
GLUCOSE P FAST SERPL-MCNC: 93 MG/DL (ref 65–99)
HCT VFR BLD AUTO: 43.9 % (ref 36.5–49.3)
HGB BLD-MCNC: 14.2 G/DL (ref 12–17)
IMM GRANULOCYTES # BLD AUTO: 0.01 THOUSAND/UL (ref 0–0.2)
IMM GRANULOCYTES NFR BLD AUTO: 0 % (ref 0–2)
LYMPHOCYTES # BLD AUTO: 1.61 THOUSANDS/ΜL (ref 0.6–4.47)
LYMPHOCYTES NFR BLD AUTO: 28 % (ref 14–44)
MCH RBC QN AUTO: 30.9 PG (ref 26.8–34.3)
MCHC RBC AUTO-ENTMCNC: 32.3 G/DL (ref 31.4–37.4)
MCV RBC AUTO: 96 FL (ref 82–98)
MONOCYTES # BLD AUTO: 0.62 THOUSAND/ΜL (ref 0.17–1.22)
MONOCYTES NFR BLD AUTO: 11 % (ref 4–12)
NEUTROPHILS # BLD AUTO: 3.17 THOUSANDS/ΜL (ref 1.85–7.62)
NEUTS SEG NFR BLD AUTO: 55 % (ref 43–75)
NRBC BLD AUTO-RTO: 0 /100 WBCS
PLATELET # BLD AUTO: 259 THOUSANDS/UL (ref 149–390)
PMV BLD AUTO: 10 FL (ref 8.9–12.7)
POTASSIUM SERPL-SCNC: 4 MMOL/L (ref 3.5–5.3)
PROT SERPL-MCNC: 7.8 G/DL (ref 6.4–8.2)
RBC # BLD AUTO: 4.59 MILLION/UL (ref 3.88–5.62)
SODIUM SERPL-SCNC: 140 MMOL/L (ref 136–145)
WBC # BLD AUTO: 5.75 THOUSAND/UL (ref 4.31–10.16)

## 2022-03-08 PROCEDURE — 36415 COLL VENOUS BLD VENIPUNCTURE: CPT

## 2022-03-08 PROCEDURE — 85025 COMPLETE CBC W/AUTO DIFF WBC: CPT

## 2022-03-08 PROCEDURE — 80053 COMPREHEN METABOLIC PANEL: CPT

## 2022-03-08 PROCEDURE — 82306 VITAMIN D 25 HYDROXY: CPT

## 2022-07-06 ENCOUNTER — NEW PATIENT COMPREHENSIVE (OUTPATIENT)
Dept: URBAN - METROPOLITAN AREA CLINIC 6 | Facility: CLINIC | Age: 72
End: 2022-07-06

## 2022-07-06 DIAGNOSIS — H40.1411: ICD-10-CM

## 2022-07-06 PROCEDURE — 92020 GONIOSCOPY: CPT

## 2022-07-06 PROCEDURE — 92133 CPTRZD OPH DX IMG PST SGM ON: CPT

## 2022-07-06 PROCEDURE — 76514 ECHO EXAM OF EYE THICKNESS: CPT

## 2022-07-06 PROCEDURE — 92202 OPSCPY EXTND ON/MAC DRAW: CPT

## 2022-07-06 PROCEDURE — 92004 COMPRE OPH EXAM NEW PT 1/>: CPT

## 2022-07-06 ASSESSMENT — VISUAL ACUITY
OD_CC: 20/25
OS_CC: 20/25
OU_CC: J1
OU_CC: 20/25

## 2022-07-06 ASSESSMENT — PACHYMETRY
OD_CT_UM: 550
OS_CT_UM: 550

## 2022-07-06 ASSESSMENT — TONOMETRY
OS_IOP_MMHG: 14
OD_IOP_MMHG: 33

## 2022-07-18 ENCOUNTER — FOLLOW UP (OUTPATIENT)
Dept: URBAN - METROPOLITAN AREA CLINIC 6 | Facility: CLINIC | Age: 72
End: 2022-07-18

## 2022-07-18 DIAGNOSIS — H40.1411: ICD-10-CM

## 2022-07-18 PROCEDURE — 92012 INTRM OPH EXAM EST PATIENT: CPT

## 2022-07-18 PROCEDURE — 92083 EXTENDED VISUAL FIELD XM: CPT

## 2022-07-18 ASSESSMENT — VISUAL ACUITY
OD_CC: 20/25
OS_CC: 20/25

## 2022-07-18 ASSESSMENT — TONOMETRY
OD_IOP_MMHG: 23
OS_IOP_MMHG: 17

## 2022-09-28 ENCOUNTER — LAB REQUISITION (OUTPATIENT)
Dept: LAB | Facility: HOSPITAL | Age: 72
End: 2022-09-28
Payer: COMMERCIAL

## 2022-09-28 DIAGNOSIS — I10 ESSENTIAL (PRIMARY) HYPERTENSION: ICD-10-CM

## 2022-09-28 DIAGNOSIS — E55.9 VITAMIN D DEFICIENCY, UNSPECIFIED: ICD-10-CM

## 2022-09-28 DIAGNOSIS — E78.5 HYPERLIPIDEMIA, UNSPECIFIED: ICD-10-CM

## 2022-09-28 DIAGNOSIS — I42.9 CARDIOMYOPATHY, UNSPECIFIED (HCC): ICD-10-CM

## 2022-09-28 LAB
25(OH)D3 SERPL-MCNC: 63.5 NG/ML (ref 30–100)
ALBUMIN SERPL BCP-MCNC: 4.4 G/DL (ref 3.5–5)
ALP SERPL-CCNC: 50 U/L (ref 46–116)
ALT SERPL W P-5'-P-CCNC: 19 U/L (ref 12–78)
ANION GAP SERPL CALCULATED.3IONS-SCNC: 7 MMOL/L (ref 4–13)
AST SERPL W P-5'-P-CCNC: 14 U/L (ref 5–45)
BASOPHILS # BLD AUTO: 0.08 THOUSANDS/ΜL (ref 0–0.1)
BASOPHILS NFR BLD AUTO: 2 % (ref 0–1)
BILIRUB SERPL-MCNC: 1.41 MG/DL (ref 0.2–1)
BILIRUB UR QL STRIP: NEGATIVE
BUN SERPL-MCNC: 27 MG/DL (ref 5–25)
CALCIUM SERPL-MCNC: 9.9 MG/DL (ref 8.3–10.1)
CHLORIDE SERPL-SCNC: 104 MMOL/L (ref 96–108)
CLARITY UR: CLEAR
CO2 SERPL-SCNC: 26 MMOL/L (ref 21–32)
COLOR UR: COLORLESS
CREAT SERPL-MCNC: 0.84 MG/DL (ref 0.6–1.3)
EOSINOPHIL # BLD AUTO: 0.29 THOUSAND/ΜL (ref 0–0.61)
EOSINOPHIL NFR BLD AUTO: 6 % (ref 0–6)
ERYTHROCYTE [DISTWIDTH] IN BLOOD BY AUTOMATED COUNT: 12.8 % (ref 11.6–15.1)
GFR SERPL CREATININE-BSD FRML MDRD: 88 ML/MIN/1.73SQ M
GLUCOSE SERPL-MCNC: 77 MG/DL (ref 65–140)
GLUCOSE UR STRIP-MCNC: ABNORMAL MG/DL
HCT VFR BLD AUTO: 46.3 % (ref 36.5–49.3)
HGB BLD-MCNC: 14.9 G/DL (ref 12–17)
HGB UR QL STRIP.AUTO: NEGATIVE
IMM GRANULOCYTES # BLD AUTO: 0 THOUSAND/UL (ref 0–0.2)
IMM GRANULOCYTES NFR BLD AUTO: 0 % (ref 0–2)
KETONES UR STRIP-MCNC: NEGATIVE MG/DL
LDLC SERPL DIRECT ASSAY-MCNC: 126 MG/DL (ref 0–100)
LEUKOCYTE ESTERASE UR QL STRIP: NEGATIVE
LYMPHOCYTES # BLD AUTO: 1.26 THOUSANDS/ΜL (ref 0.6–4.47)
LYMPHOCYTES NFR BLD AUTO: 24 % (ref 14–44)
MCH RBC QN AUTO: 30.5 PG (ref 26.8–34.3)
MCHC RBC AUTO-ENTMCNC: 32.2 G/DL (ref 31.4–37.4)
MCV RBC AUTO: 95 FL (ref 82–98)
MONOCYTES # BLD AUTO: 0.75 THOUSAND/ΜL (ref 0.17–1.22)
MONOCYTES NFR BLD AUTO: 14 % (ref 4–12)
NEUTROPHILS # BLD AUTO: 2.93 THOUSANDS/ΜL (ref 1.85–7.62)
NEUTS SEG NFR BLD AUTO: 54 % (ref 43–75)
NITRITE UR QL STRIP: NEGATIVE
NRBC BLD AUTO-RTO: 0 /100 WBCS
PH UR STRIP.AUTO: 6 [PH]
PLATELET # BLD AUTO: 296 THOUSANDS/UL (ref 149–390)
PMV BLD AUTO: 9.8 FL (ref 8.9–12.7)
POTASSIUM SERPL-SCNC: 4.5 MMOL/L (ref 3.5–5.3)
PROT SERPL-MCNC: 7.8 G/DL (ref 6.4–8.4)
PROT UR STRIP-MCNC: NEGATIVE MG/DL
RBC # BLD AUTO: 4.89 MILLION/UL (ref 3.88–5.62)
SODIUM SERPL-SCNC: 137 MMOL/L (ref 135–147)
SP GR UR STRIP.AUTO: 1.01 (ref 1–1.03)
UROBILINOGEN UR STRIP-ACNC: <2 MG/DL
WBC # BLD AUTO: 5.31 THOUSAND/UL (ref 4.31–10.16)

## 2022-09-28 PROCEDURE — 82306 VITAMIN D 25 HYDROXY: CPT | Performed by: INTERNAL MEDICINE

## 2022-09-28 PROCEDURE — 83721 ASSAY OF BLOOD LIPOPROTEIN: CPT | Performed by: INTERNAL MEDICINE

## 2022-09-28 PROCEDURE — 85025 COMPLETE CBC W/AUTO DIFF WBC: CPT | Performed by: INTERNAL MEDICINE

## 2022-09-28 PROCEDURE — 80053 COMPREHEN METABOLIC PANEL: CPT | Performed by: INTERNAL MEDICINE

## 2022-10-18 ENCOUNTER — APPOINTMENT (OUTPATIENT)
Dept: LAB | Age: 72
End: 2022-10-18
Payer: COMMERCIAL

## 2022-10-18 DIAGNOSIS — Z79.899 ENCOUNTER FOR LONG-TERM (CURRENT) USE OF OTHER MEDICATIONS: ICD-10-CM

## 2022-10-18 DIAGNOSIS — R73.09 IMPAIRED GLUCOSE TOLERANCE TEST: ICD-10-CM

## 2022-10-18 LAB
EST. AVERAGE GLUCOSE BLD GHB EST-MCNC: 114 MG/DL
HBA1C MFR BLD: 5.6 %

## 2022-10-18 PROCEDURE — 83036 HEMOGLOBIN GLYCOSYLATED A1C: CPT

## 2022-10-18 PROCEDURE — 36415 COLL VENOUS BLD VENIPUNCTURE: CPT

## 2022-10-24 ENCOUNTER — CONSULT (OUTPATIENT)
Dept: SURGERY | Facility: CLINIC | Age: 72
End: 2022-10-24
Payer: COMMERCIAL

## 2022-10-24 ENCOUNTER — PREP FOR PROCEDURE (OUTPATIENT)
Dept: SURGERY | Facility: CLINIC | Age: 72
End: 2022-10-24

## 2022-10-24 VITALS
HEIGHT: 72 IN | BODY MASS INDEX: 21.81 KG/M2 | HEART RATE: 64 BPM | WEIGHT: 161 LBS | DIASTOLIC BLOOD PRESSURE: 68 MMHG | SYSTOLIC BLOOD PRESSURE: 110 MMHG

## 2022-10-24 DIAGNOSIS — K43.2 INCISIONAL HERNIA, WITHOUT OBSTRUCTION OR GANGRENE: Primary | ICD-10-CM

## 2022-10-24 DIAGNOSIS — K40.90 INGUINAL HERNIA: Primary | ICD-10-CM

## 2022-10-24 DIAGNOSIS — K40.90 INGUINAL HERNIA: ICD-10-CM

## 2022-10-24 PROCEDURE — 99203 OFFICE O/P NEW LOW 30 MIN: CPT | Performed by: SURGERY

## 2022-10-24 RX ORDER — FLUTICASONE PROPIONATE AND SALMETEROL 250; 50 UG/1; UG/1
1 POWDER RESPIRATORY (INHALATION) 2 TIMES DAILY
COMMUNITY
Start: 2022-10-07

## 2022-10-24 RX ORDER — DAPAGLIFLOZIN 5 MG/1
5 TABLET, FILM COATED ORAL DAILY
COMMUNITY
Start: 2022-10-15

## 2022-10-24 RX ORDER — TAMSULOSIN HYDROCHLORIDE 0.4 MG/1
0.4 CAPSULE ORAL
Qty: 10 CAPSULE | Refills: 0 | Status: SHIPPED | OUTPATIENT
Start: 2022-10-24

## 2022-10-24 RX ORDER — LEVOCARNITINE TARTRATE 250 MG
500 CAPSULE ORAL DAILY
COMMUNITY

## 2022-10-24 RX ORDER — ASPIRIN 81 MG/1
81 TABLET, CHEWABLE ORAL DAILY
COMMUNITY

## 2022-10-24 RX ORDER — BIMATOPROST 0.01 %
DROPS OPHTHALMIC (EYE)
COMMUNITY
Start: 2022-10-11

## 2022-10-24 NOTE — H&P (VIEW-ONLY)
Assessment/Plan:  Patient presents with a large right inguinal hernia  Is been present over the last 6 years  It causes discomfort with activity  It is improved with rest   Operative repair is recommended  Risks and benefits explained in detail  Patient is agreeable  He is due for updated cardiac testing through his cardiologist at [facility]  Stress test is planned on November 13th  There are no diagnoses linked to this encounter  Subjective:      Patient ID: Daria Snow is a 81 y o  male  Patient presents for right inguinal hernia consult  States he has had a bulge and burning pain RLQ for 6 years  Limits his activities  History of left inguinal hernia repair 2016  The following portions of the patient's history were reviewed and updated as appropriate:     He  has a past medical history of Asthma and Heart failure ([facility] [address] )  He  has a past surgical history that includes Replacement total knee (Right); Nasal polyp surgery; and pr repair ing hernia,5+y/o,reducibl (Left, 6/13/2016)  His family history is not on file  He  reports that he has quit smoking  He has never used smokeless tobacco  He reports that he does not drink alcohol and does not use drugs  Current Outpatient Medications   Medication Sig Dispense Refill   • albuterol (PROAIR HFA) 90 mcg/act inhaler 1 puff every 6 (six) hours as needed for wheezing     • ascorbic acid (VITAMIN C) 500 mg tablet Take 500 mg by mouth daily  • aspirin 81 mg chewable tablet Chew 81 mg daily     • aspirin 81 MG tablet Take 81 mg by mouth daily  • Boswellia-Glucosamine-Vit D (OSTEO BI-FLEX ONE PER DAY PO) Take by mouth     • budesonide-formoterol (SYMBICORT) 160-4 5 mcg/act inhaler 1 puff daily       • Cholecalciferol (VITAMIN D3) 1000 UNITS CAPS Take by mouth  • Coenzyme Q10 (CO Q 10) 100 MG CAPS Take by mouth       • Farxiga 5 MG TABS Take 5 mg by mouth daily     • fluticasone (FLONASE) 50 mcg/act nasal spray 1 spray into each nostril daily as needed for rhinitis     • Fluticasone-Salmeterol (Advair) 250-50 mcg/dose inhaler 1 puff 2 (two) times a day     • Levocarnitine 250 MG CAPS Take 500 mg by mouth daily     • Lumigan 0 01 % ophthalmic drops INSTILL 1 DROP INTO THE RIGHT EYE EVERY EVENING     • Magnesium Oxide 400 MG CAPS Take 1 capsule by mouth daily     • metoprolol succinate (TOPROL-XL) 50 mg 24 hr tablet Take 50 mg by mouth 2 (two) times a day       • montelukast (SINGULAIR) 10 mg tablet Take 10 mg by mouth daily at bedtime  • Multiple Vitamin (MULTIVITAMIN) tablet Take 1 tablet by mouth daily  • MULTIPLE VITAMINS-MINERALS PO Take 1 tablet by mouth daily     • Omega-3 Fatty Acids (FISH OIL PO) Take 1 capsule by mouth daily     • oxyCODONE-acetaminophen (PERCOCET) 5-325 mg per tablet Take 2 tablets by mouth every 4 (four) hours as needed for moderate pain for up to 20 doses  Max Daily Amount: 12 tablets 40 tablet 0   • sacubitril-valsartan (ENTRESTO)  MG TABS Take 1 tablet by mouth 2 (two) times a day       • spironolactone (ALDACTONE) 25 mg tablet Take 25 mg by mouth daily  • thiamine (VITAMIN B1) 100 mg tablet Take 100 mg by mouth daily  No current facility-administered medications for this visit  He has No Known Allergies       Review of Systems   Constitutional: Negative  Negative for activity change  HENT: Negative  Eyes: Negative  Respiratory: Negative  Cardiovascular: Negative  Gastrointestinal: Positive for abdominal pain  Endocrine: Negative  Genitourinary: Negative  Musculoskeletal: Negative  Skin: Negative  Allergic/Immunologic: Negative  Neurological: Positive for dizziness  Psychiatric/Behavioral: Negative for agitation, behavioral problems and confusion  The patient is not nervous/anxious  All other systems reviewed and are negative          Objective:      /68   Pulse 64   Ht 6' (1 829 m)   Wt 73 kg (161 lb)   BMI 21 84 kg/m²          Physical Exam  Constitutional:       Appearance: He is well-developed  He is not diaphoretic  HENT:      Head: Normocephalic and atraumatic  Eyes:      General: No scleral icterus  Right eye: No discharge  Left eye: No discharge  Extraocular Movements: Extraocular movements intact  Conjunctiva/sclera: Conjunctivae normal    Neck:      Thyroid: No thyromegaly  Trachea: No tracheal deviation  Cardiovascular:      Rate and Rhythm: Normal rate and regular rhythm  Heart sounds: Normal heart sounds  No murmur heard  No friction rub  Pulmonary:      Effort: Pulmonary effort is normal  No respiratory distress  Breath sounds: Normal breath sounds  No stridor  No wheezing  Chest:      Chest wall: No tenderness  Abdominal:      General: There is no distension  Palpations: Abdomen is soft  There is no mass  Tenderness: There is no abdominal tenderness  There is no guarding or rebound  Left CVA tenderness: Large right inguinal hernias present  This is reducible  Hernia: A hernia is present  Musculoskeletal:         General: No tenderness  Normal range of motion  Cervical back: Normal range of motion and neck supple  Lymphadenopathy:      Cervical: No cervical adenopathy  Skin:     General: Skin is warm and dry  Findings: No erythema or rash  Neurological:      Mental Status: He is alert and oriented to person, place, and time  Cranial Nerves: No cranial nerve deficit        Coordination: Coordination normal    Psychiatric:         Behavior: Behavior normal          Judgment: Judgment normal

## 2022-10-24 NOTE — PROGRESS NOTES
Assessment/Plan:  Patient presents with a large right inguinal hernia  Is been present over the last 6 years  It causes discomfort with activity  It is improved with rest   Operative repair is recommended  Risks and benefits explained in detail  Patient is agreeable  He is due for updated cardiac testing through his cardiologist at [facility]  Stress test is planned on November 13th  There are no diagnoses linked to this encounter  Subjective:      Patient ID: Wendi Geller is a 81 y o  male  Patient presents for right inguinal hernia consult  States he has had a bulge and burning pain RLQ for 6 years  Limits his activities  History of left inguinal hernia repair 2016  The following portions of the patient's history were reviewed and updated as appropriate:     He  has a past medical history of Asthma and Heart failure ([facility] [address] )  He  has a past surgical history that includes Replacement total knee (Right); Nasal polyp surgery; and pr repair ing hernia,5+y/o,reducibl (Left, 6/13/2016)  His family history is not on file  He  reports that he has quit smoking  He has never used smokeless tobacco  He reports that he does not drink alcohol and does not use drugs  Current Outpatient Medications   Medication Sig Dispense Refill   • albuterol (PROAIR HFA) 90 mcg/act inhaler 1 puff every 6 (six) hours as needed for wheezing     • ascorbic acid (VITAMIN C) 500 mg tablet Take 500 mg by mouth daily  • aspirin 81 mg chewable tablet Chew 81 mg daily     • aspirin 81 MG tablet Take 81 mg by mouth daily  • Boswellia-Glucosamine-Vit D (OSTEO BI-FLEX ONE PER DAY PO) Take by mouth     • budesonide-formoterol (SYMBICORT) 160-4 5 mcg/act inhaler 1 puff daily       • Cholecalciferol (VITAMIN D3) 1000 UNITS CAPS Take by mouth  • Coenzyme Q10 (CO Q 10) 100 MG CAPS Take by mouth       • Farxiga 5 MG TABS Take 5 mg by mouth daily     • fluticasone (FLONASE) 50 mcg/act nasal spray 1 spray into each nostril daily as needed for rhinitis     • Fluticasone-Salmeterol (Advair) 250-50 mcg/dose inhaler 1 puff 2 (two) times a day     • Levocarnitine 250 MG CAPS Take 500 mg by mouth daily     • Lumigan 0 01 % ophthalmic drops INSTILL 1 DROP INTO THE RIGHT EYE EVERY EVENING     • Magnesium Oxide 400 MG CAPS Take 1 capsule by mouth daily     • metoprolol succinate (TOPROL-XL) 50 mg 24 hr tablet Take 50 mg by mouth 2 (two) times a day       • montelukast (SINGULAIR) 10 mg tablet Take 10 mg by mouth daily at bedtime  • Multiple Vitamin (MULTIVITAMIN) tablet Take 1 tablet by mouth daily  • MULTIPLE VITAMINS-MINERALS PO Take 1 tablet by mouth daily     • Omega-3 Fatty Acids (FISH OIL PO) Take 1 capsule by mouth daily     • oxyCODONE-acetaminophen (PERCOCET) 5-325 mg per tablet Take 2 tablets by mouth every 4 (four) hours as needed for moderate pain for up to 20 doses  Max Daily Amount: 12 tablets 40 tablet 0   • sacubitril-valsartan (ENTRESTO)  MG TABS Take 1 tablet by mouth 2 (two) times a day       • spironolactone (ALDACTONE) 25 mg tablet Take 25 mg by mouth daily  • thiamine (VITAMIN B1) 100 mg tablet Take 100 mg by mouth daily  No current facility-administered medications for this visit  He has No Known Allergies       Review of Systems   Constitutional: Negative  Negative for activity change  HENT: Negative  Eyes: Negative  Respiratory: Negative  Cardiovascular: Negative  Gastrointestinal: Positive for abdominal pain  Endocrine: Negative  Genitourinary: Negative  Musculoskeletal: Negative  Skin: Negative  Allergic/Immunologic: Negative  Neurological: Positive for dizziness  Psychiatric/Behavioral: Negative for agitation, behavioral problems and confusion  The patient is not nervous/anxious  All other systems reviewed and are negative          Objective:      /68   Pulse 64   Ht 6' (1 829 m)   Wt 73 kg (161 lb)   BMI 21 84 kg/m²          Physical Exam  Constitutional:       Appearance: He is well-developed  He is not diaphoretic  HENT:      Head: Normocephalic and atraumatic  Eyes:      General: No scleral icterus  Right eye: No discharge  Left eye: No discharge  Extraocular Movements: Extraocular movements intact  Conjunctiva/sclera: Conjunctivae normal    Neck:      Thyroid: No thyromegaly  Trachea: No tracheal deviation  Cardiovascular:      Rate and Rhythm: Normal rate and regular rhythm  Heart sounds: Normal heart sounds  No murmur heard  No friction rub  Pulmonary:      Effort: Pulmonary effort is normal  No respiratory distress  Breath sounds: Normal breath sounds  No stridor  No wheezing  Chest:      Chest wall: No tenderness  Abdominal:      General: There is no distension  Palpations: Abdomen is soft  There is no mass  Tenderness: There is no abdominal tenderness  There is no guarding or rebound  Left CVA tenderness: Large right inguinal hernias present  This is reducible  Hernia: A hernia is present  Musculoskeletal:         General: No tenderness  Normal range of motion  Cervical back: Normal range of motion and neck supple  Lymphadenopathy:      Cervical: No cervical adenopathy  Skin:     General: Skin is warm and dry  Findings: No erythema or rash  Neurological:      Mental Status: He is alert and oriented to person, place, and time  Cranial Nerves: No cranial nerve deficit        Coordination: Coordination normal    Psychiatric:         Behavior: Behavior normal          Judgment: Judgment normal

## 2022-11-01 ENCOUNTER — ANESTHESIA EVENT (OUTPATIENT)
Dept: PERIOP | Facility: AMBULARY SURGERY CENTER | Age: 72
End: 2022-11-01

## 2022-11-04 NOTE — DISCHARGE INSTR - AVS FIRST PAGE
Please call the office when you leave to schedule an appointment for 2 weeks  Please call [phone]                      [facility] [address], [place], [address]  [address] between [facility] and [facility]  Activity:    May lift 10 lb as many times as desired the 1st week,       20 lb in 2 weeks,       30 lb in 3 weeks  Walking is encouraged  Normal daily activities including climbing steps are okay  Do not engage in strenuous activity ( sit-ups or crutches) or contact sports for 4-6 weeks post-operatively    Return to Work:   Okay to return to work when you feel well if you desire  Diet:   You may return to your normal healthy diet  Wound Care: Your wound is closed with dissolvable stitches and glue  It is okay to shower  Wash incision gently with soap and water and pat dry  Do not soak incisions in bath water or swim for two weeks  Do not apply any creams or ointments  Pain Medication:   Please take as directed if needed  May use Advil or Motrin in addition  Recall, the pain medicine and anesthesia is associated with constipation  No driving while taking narcotic pain medications  Other: It is normal to developed a “healing ridge” / firm incision after surgery  This is your body making scar tissue  It is a good sign  Constipation is very common after general anesthesia  Please use milk of magnesia as needed in order to help prevent constipation  It is normal to get bruising after surgery  If you have questions after discharge please call the office      If you have increased pain, fever >101 5, increased drainage, redness or a bad smell at your surgery site, please call us immediately or come directly to the Emergency Room

## 2022-11-10 NOTE — PRE-PROCEDURE INSTRUCTIONS
Pre-Surgery Instructions:   Medication Instructions   • albuterol (PROVENTIL HFA,VENTOLIN HFA) 90 mcg/act inhaler Instructed to take as needed including DOS   • ascorbic acid (VITAMIN C) 500 mg tablet Instructed to stop all Vitamins and Supplements over the counter from now until after surgery   • aspirin 81 MG tablet Instructions provided by MD   • Boswellia-Glucosamine-Vit D (OSTEO BI-FLEX ONE PER DAY PO) Instructed to stop all Vitamins and Supplements over the counter from now until after surgery   • budesonide-formoterol (SYMBICORT) 160-4 5 mcg/act inhaler Instructed to take per normal schedule   • Cholecalciferol (VITAMIN D3) 1000 UNITS CAPS Instructed to stop all Vitamins and Supplements over the counter from now until after surgery   • Coenzyme Q10 (CO Q 10) 100 MG CAPS Instructed to stop all Vitamins and Supplements over the counter from now until after surgery   • Farxiga 5 MG TABS Instructed to take per normal schedule   • fluticasone (FLONASE) 50 mcg/act nasal spray Instructed to take per normal schedule   • Fluticasone-Salmeterol (Advair) 250-50 mcg/dose inhaler Instructed to take per normal schedule   • Levocarnitine 250 MG CAPS Instructed to stop all Vitamins and Supplements over the counter from now until after surgery   • Lumigan 0 01 % ophthalmic drops Instructed to take per normal schedule   • Magnesium Oxide 400 MG CAPS Instructed to stop all Vitamins and Supplements over the counter from now until after surgery   • metoprolol succinate (TOPROL-XL) 50 mg 24 hr tablet Take per normal schedule    • Multiple Vitamin (MULTIVITAMIN) tablet Instructed to stop all Vitamins and Supplements over the counter from now until after surgery   • MULTIPLE VITAMINS-MINERALS PO Instructed to stop all Vitamins and Supplements over the counter from now until after surgery   • Omega-3 Fatty Acids (FISH OIL PO) Instructed to stop all Vitamins and Supplements over the counter from now until after surgery   • sacubitril-valsartan (ENTRESTO)  MG TABS Instructed to take per normal schedule except DOS   • spironolactone (ALDACTONE) 25 mg tablet Instructed to take per normal schedule except DOS   • tamsulosin (FLOMAX) 0 4 mg Take per normal schedule    • thiamine (VITAMIN B1) 100 mg tablet Instructed to stop all Vitamins and Supplements over the counter from now until after surgery    Pre op,medications and showering instructions reviewed-Patient has hibiclens  Instructed to avoid all ASA/NSAIDs and OTC Vit/Supp from now until after surgery per anesthesia guidelines  Tylenol ok prn  ASA instructions given to patient by Surgeons office   Pt  Verbalized an understanding of all instructions reviewed and offers no concerns at this time

## 2022-11-10 NOTE — ANESTHESIA PREPROCEDURE EVALUATION
Procedure:  REPAIR HERNIA INGUINAL (Right Groin)    ECHO (08/02/18):   - LVEF 35-40%    --------------------------------------------------  STRESS ECHO (11/022/22): This stress echo showed no evidence of provokable ischemia at moderate   workload, equivalent of 7 5 METS  Frequent PVCs noted  Flat BP response to exercise  Resting LV systolic function is mildly impaired, EF ~40-45%  Left Ventricle  Left ventricle is normal in size  Wall thickness is normal  Systolic function is mildly decreased with an ejection fraction of 40-45%  There is mild global hypokinesis  Right Ventricle  Right ventricle cavity is normal  Systolic function is normal     Left Atrium  Left atrium cavity is mildly dilated      Right Atrium  Right atrium cavity is normal     Relevant Problems   ANESTHESIA (within normal limits)      CARDIO   (+) Congestive heart failure (HCC)   (+) First degree AV block   (+) Hypertension   (+) Ventricular premature beats      ENDO (within normal limits)      GI/HEPATIC (within normal limits)      /RENAL (within normal limits)      GYN (within normal limits)      HEMATOLOGY (within normal limits)      MUSCULOSKELETAL (within normal limits)      NEURO/PSYCH (within normal limits)      PULMONARY   (+) Asthma      Other   (+) Left inguinal hernia      Lab Results   Component Value Date    WBC 5 31 09/28/2022    HGB 14 9 09/28/2022    HCT 46 3 09/28/2022    MCV 95 09/28/2022     09/28/2022     Lab Results   Component Value Date     04/24/2015    SODIUM 137 09/28/2022    K 4 5 09/28/2022     09/28/2022    CO2 26 09/28/2022    ANIONGAP 6 04/24/2015    AGAP 7 09/28/2022    BUN 27 (H) 09/28/2022    CREATININE 0 84 09/28/2022    GLUC 77 09/28/2022    GLUF 93 03/08/2022    CALCIUM 9 9 09/28/2022    AST 14 09/28/2022    ALT 19 09/28/2022    ALKPHOS 50 09/28/2022    PROT 7 8 04/18/2014    TP 7 8 09/28/2022    BILITOT 0 92 04/18/2014    TBILI 1 41 (H) 09/28/2022    EGFR 88 09/28/2022     No results found for: INR, PROTIME  No results found for: PTT    Physical Exam    Airway    Mallampati score: II  TM Distance: >3 FB  Neck ROM: full     Dental   No notable dental hx     Cardiovascular  Rhythm: regular, Rate: normal, Cardiovascular exam normal    Pulmonary  Pulmonary exam normal Breath sounds clear to auscultation,     Other Findings        Anesthesia Plan  ASA Score- 3     Anesthesia Type- general with ASA Monitors  Additional Monitors:   Airway Plan: LMA  Comment: SOC- Patient had a stress echo 11/2 at [address] [place] [address] [address]  Ef 55%  (See care everywhere)  Plan Factors-Exercise tolerance (METS): >4 METS  Chart reviewed  EKG reviewed  Imaging results reviewed  Existing labs reviewed  Patient summary reviewed  Patient is not a current smoker  Patient did not smoke on day of surgery  Obstructive sleep apnea risk education given perioperatively  Induction- intravenous  Postoperative Plan- Plan for postoperative opioid use  Informed Consent- Anesthetic plan and risks discussed with patient  I personally reviewed this patient with the CRNA  Discussed and agreed on the Anesthesia Plan with the CRNA  Robby Encarnacion

## 2022-11-11 ENCOUNTER — ANESTHESIA (OUTPATIENT)
Dept: PERIOP | Facility: AMBULARY SURGERY CENTER | Age: 72
End: 2022-11-11

## 2022-11-11 ENCOUNTER — HOSPITAL ENCOUNTER (OUTPATIENT)
Facility: AMBULARY SURGERY CENTER | Age: 72
Setting detail: OUTPATIENT SURGERY
Discharge: HOME | End: 2022-11-11
Attending: SURGERY | Admitting: SURGERY

## 2022-11-11 VITALS
OXYGEN SATURATION: 93 % | TEMPERATURE: 97 F | SYSTOLIC BLOOD PRESSURE: 107 MMHG | RESPIRATION RATE: 16 BRPM | HEIGHT: 72 IN | DIASTOLIC BLOOD PRESSURE: 56 MMHG | BODY MASS INDEX: 21.81 KG/M2 | WEIGHT: 161 LBS | HEART RATE: 73 BPM

## 2022-11-11 DIAGNOSIS — K40.90 LEFT INGUINAL HERNIA: Primary | ICD-10-CM

## 2022-11-11 PROBLEM — R00.2 PALPITATIONS: Status: ACTIVE | Noted: 2017-06-21

## 2022-11-11 PROBLEM — I42.9 CARDIOMYOPATHY (HCC): Status: ACTIVE | Noted: 2021-01-18

## 2022-11-11 PROBLEM — I10 HYPERTENSION: Status: ACTIVE | Noted: 2017-06-21

## 2022-11-11 PROBLEM — I47.29 NONSUSTAINED VENTRICULAR TACHYCARDIA: Status: ACTIVE | Noted: 2017-06-21

## 2022-11-11 PROBLEM — E55.9 VITAMIN D DEFICIENCY: Status: ACTIVE | Noted: 2022-01-24

## 2022-11-11 PROBLEM — I50.9 CONGESTIVE HEART FAILURE (HCC): Status: ACTIVE | Noted: 2017-06-19

## 2022-11-11 PROBLEM — I44.0 FIRST DEGREE AV BLOCK: Status: ACTIVE | Noted: 2017-06-19

## 2022-11-11 PROBLEM — R93.1 DECREASED CARDIAC EJECTION FRACTION: Status: ACTIVE | Noted: 2022-01-24

## 2022-11-11 PROBLEM — I49.3 VENTRICULAR PREMATURE BEATS: Status: ACTIVE | Noted: 2021-01-18

## 2022-11-11 PROBLEM — J45.909 ASTHMA: Status: ACTIVE | Noted: 2017-06-19

## 2022-11-11 DEVICE — MODIFIED ONFLEX MESH
Type: IMPLANTABLE DEVICE | Site: GROIN | Status: FUNCTIONAL
Brand: MODIFIED ONFLEX MESH

## 2022-11-11 RX ORDER — BUPIVACAINE HYDROCHLORIDE 2.5 MG/ML
INJECTION, SOLUTION EPIDURAL; INFILTRATION; INTRACAUDAL AS NEEDED
Status: DISCONTINUED | OUTPATIENT
Start: 2022-11-11 | End: 2022-11-11 | Stop reason: HOSPADM

## 2022-11-11 RX ORDER — SODIUM CHLORIDE, SODIUM LACTATE, POTASSIUM CHLORIDE, CALCIUM CHLORIDE 600; 310; 30; 20 MG/100ML; MG/100ML; MG/100ML; MG/100ML
125 INJECTION, SOLUTION INTRAVENOUS CONTINUOUS
Status: DISCONTINUED | OUTPATIENT
Start: 2022-11-11 | End: 2022-11-11 | Stop reason: HOSPADM

## 2022-11-11 RX ORDER — ONDANSETRON 2 MG/ML
INJECTION INTRAMUSCULAR; INTRAVENOUS AS NEEDED
Status: DISCONTINUED | OUTPATIENT
Start: 2022-11-11 | End: 2022-11-11

## 2022-11-11 RX ORDER — EPHEDRINE SULFATE 50 MG/ML
INJECTION INTRAVENOUS AS NEEDED
Status: DISCONTINUED | OUTPATIENT
Start: 2022-11-11 | End: 2022-11-11

## 2022-11-11 RX ORDER — CEFAZOLIN SODIUM 1 G/50ML
1000 SOLUTION INTRAVENOUS ONCE
Status: COMPLETED | OUTPATIENT
Start: 2022-11-11 | End: 2022-11-11

## 2022-11-11 RX ORDER — HYDROCODONE BITARTRATE AND ACETAMINOPHEN 5; 325 MG/1; MG/1
1 TABLET ORAL EVERY 6 HOURS PRN
Status: DISCONTINUED | OUTPATIENT
Start: 2022-11-11 | End: 2022-11-11 | Stop reason: HOSPADM

## 2022-11-11 RX ORDER — FENTANYL CITRATE 50 UG/ML
INJECTION, SOLUTION INTRAMUSCULAR; INTRAVENOUS AS NEEDED
Status: DISCONTINUED | OUTPATIENT
Start: 2022-11-11 | End: 2022-11-11

## 2022-11-11 RX ORDER — PROPOFOL 10 MG/ML
INJECTION, EMULSION INTRAVENOUS AS NEEDED
Status: DISCONTINUED | OUTPATIENT
Start: 2022-11-11 | End: 2022-11-11

## 2022-11-11 RX ORDER — ONDANSETRON 2 MG/ML
4 INJECTION INTRAMUSCULAR; INTRAVENOUS EVERY 4 HOURS PRN
Status: DISCONTINUED | OUTPATIENT
Start: 2022-11-11 | End: 2022-11-11 | Stop reason: HOSPADM

## 2022-11-11 RX ORDER — HYDROMORPHONE HCL/PF 1 MG/ML
0.5 SYRINGE (ML) INJECTION
Status: DISCONTINUED | OUTPATIENT
Start: 2022-11-11 | End: 2022-11-11 | Stop reason: HOSPADM

## 2022-11-11 RX ORDER — ACETAMINOPHEN 325 MG/1
650 TABLET ORAL EVERY 4 HOURS PRN
Status: DISCONTINUED | OUTPATIENT
Start: 2022-11-11 | End: 2022-11-11 | Stop reason: HOSPADM

## 2022-11-11 RX ORDER — KETOROLAC TROMETHAMINE 30 MG/ML
INJECTION, SOLUTION INTRAMUSCULAR; INTRAVENOUS AS NEEDED
Status: DISCONTINUED | OUTPATIENT
Start: 2022-11-11 | End: 2022-11-11

## 2022-11-11 RX ORDER — LIDOCAINE HYDROCHLORIDE 10 MG/ML
INJECTION, SOLUTION EPIDURAL; INFILTRATION; INTRACAUDAL; PERINEURAL AS NEEDED
Status: DISCONTINUED | OUTPATIENT
Start: 2022-11-11 | End: 2022-11-11

## 2022-11-11 RX ORDER — GLYCOPYRROLATE 0.2 MG/ML
INJECTION INTRAMUSCULAR; INTRAVENOUS AS NEEDED
Status: DISCONTINUED | OUTPATIENT
Start: 2022-11-11 | End: 2022-11-11

## 2022-11-11 RX ORDER — FENTANYL CITRATE/PF 50 MCG/ML
25 SYRINGE (ML) INJECTION
Status: DISCONTINUED | OUTPATIENT
Start: 2022-11-11 | End: 2022-11-11 | Stop reason: HOSPADM

## 2022-11-11 RX ORDER — OXYCODONE HYDROCHLORIDE AND ACETAMINOPHEN 5; 325 MG/1; MG/1
1 TABLET ORAL EVERY 4 HOURS PRN
Qty: 10 TABLET | Refills: 0 | Status: SHIPPED | OUTPATIENT
Start: 2022-11-11

## 2022-11-11 RX ADMIN — EPHEDRINE SULFATE 5 MG: 50 INJECTION, SOLUTION INTRAVENOUS at 10:35

## 2022-11-11 RX ADMIN — FENTANYL CITRATE 25 MCG: 50 INJECTION INTRAMUSCULAR; INTRAVENOUS at 11:15

## 2022-11-11 RX ADMIN — EPHEDRINE SULFATE 10 MG: 50 INJECTION, SOLUTION INTRAVENOUS at 10:58

## 2022-11-11 RX ADMIN — SODIUM CHLORIDE, SODIUM LACTATE, POTASSIUM CHLORIDE, AND CALCIUM CHLORIDE: .6; .31; .03; .02 INJECTION, SOLUTION INTRAVENOUS at 10:22

## 2022-11-11 RX ADMIN — LIDOCAINE HYDROCHLORIDE 50 MG: 10 INJECTION, SOLUTION EPIDURAL; INFILTRATION; INTRACAUDAL; PERINEURAL at 10:26

## 2022-11-11 RX ADMIN — PHENYLEPHRINE HYDROCHLORIDE 20 MCG/MIN: 10 INJECTION INTRAVENOUS at 10:46

## 2022-11-11 RX ADMIN — GLYCOPYRROLATE 0.1 MCG: 0.2 INJECTION INTRAMUSCULAR; INTRAVENOUS at 10:49

## 2022-11-11 RX ADMIN — PROPOFOL 150 MG: 10 INJECTION, EMULSION INTRAVENOUS at 10:26

## 2022-11-11 RX ADMIN — EPHEDRINE SULFATE 10 MG: 50 INJECTION, SOLUTION INTRAVENOUS at 10:50

## 2022-11-11 RX ADMIN — CEFAZOLIN SODIUM 1000 MG: 1 SOLUTION INTRAVENOUS at 10:30

## 2022-11-11 RX ADMIN — SODIUM CHLORIDE, SODIUM LACTATE, POTASSIUM CHLORIDE, AND CALCIUM CHLORIDE: .6; .31; .03; .02 INJECTION, SOLUTION INTRAVENOUS at 11:13

## 2022-11-11 RX ADMIN — ONDANSETRON 4 MG: 2 INJECTION INTRAMUSCULAR; INTRAVENOUS at 11:42

## 2022-11-11 RX ADMIN — GLYCOPYRROLATE 0.1 MCG: 0.2 INJECTION INTRAMUSCULAR; INTRAVENOUS at 10:59

## 2022-11-11 RX ADMIN — KETOROLAC TROMETHAMINE 15 MG: 30 INJECTION, SOLUTION INTRAMUSCULAR at 11:42

## 2022-11-11 RX ADMIN — EPHEDRINE SULFATE 5 MG: 50 INJECTION, SOLUTION INTRAVENOUS at 10:41

## 2022-11-11 RX ADMIN — FENTANYL CITRATE 25 MCG: 50 INJECTION INTRAMUSCULAR; INTRAVENOUS at 10:47

## 2022-11-11 RX ADMIN — FENTANYL CITRATE 25 MCG: 50 INJECTION INTRAMUSCULAR; INTRAVENOUS at 10:26

## 2022-11-11 RX ADMIN — EPHEDRINE SULFATE 10 MG: 50 INJECTION, SOLUTION INTRAVENOUS at 10:30

## 2022-11-11 RX ADMIN — FENTANYL CITRATE 25 MCG: 50 INJECTION INTRAMUSCULAR; INTRAVENOUS at 11:35

## 2022-11-11 NOTE — INTERVAL H&P NOTE
H&P reviewed  After examining the patient I find no changes in the patients condition since the H&P had been written      Vitals:    11/11/22 0910   BP: 114/66   Pulse: 56   Resp: 18   Temp: 97 5 °F (36 4 °C)   SpO2: 98%

## 2022-11-11 NOTE — OP NOTE
OPERATIVE REPORT  PATIENT NAME: Dayana Felder    :  1950  MRN: [id number]  Pt Location: [facility] 01    SURGERY DATE: 2022    Surgeon(s) and Role:     * Carla Winchester MD - Primary     * Keaton Paez PA-C her assistance was needed as no qualified surgical residents available  Exposure retraction was necessary    Preop Diagnosis:  Inguinal hernia [K40 90]    Post-Op Diagnosis Codes:     * Inguinal hernia [K40 90]    Procedure(s) (LRB):  REPAIR HERNIA INGUINAL (Right) with mesh    Specimen(s):  * No specimens in log *    Estimated Blood Loss:   Minimal    Drains:  * No LDAs found *    Anesthesia Type:   General    Operative Indications:  Inguinal hernia [K40 90]    Independent, nonsmoker, ASA 3, wound class 1, BMI 22, weight 160, height 72   Congestive heart failure (HCC)   (+) First degree AV block   (+) Hypertension   (+) Ventricular premature          Complications:   None    Procedure and Technique: Dayana Felder is a 81 y o  male was brought into the operative suite and identified visually and by arm band  The patient was placed in the supine position  Careful attention was made towards padding and positioning of the extremities  After sterile prep and drape, a timeout was completed  The prep was dry  Antibiotics were provided  Athrombic pumps in place  0 25% Marcaine with epinephrine was utilized throughout the procedure  An incision was made  within the right inguinal region  The incision was carried down through the skin and subcutaneous tissue  The superficial epigastric vein was clamped, ligated and  divided    The external oblique fibers were identified  The external ring was identified  The external oblique fibers were then split in the direction of their fibers  Hemostats were placed on the cut edges  A self-retaining retractor was then placed  Exploration of the inguinal canal commenced    The cremasteric fibers were then divided along the fiber direction of its fibers the cord structures  The cord was encircled in a atraumatic Penrose drain and preserved during dissection  Identification of a large indirect inguinal hernia with dense adhesions was performed  High dissection was completed at the level of the internal ring  Preperitoneal dissection commenced identifying and preserving the inferior epigastric vessels  A preperitoneal mesh was then inserted  The branch of the genitofemoral nerve was divided in order to help prevent postoperative neuralgia  The inguinal floor was then repaired with interrupted figure-of-eight 0 Vicryl suture  The indirect inguinal ring was repositioned more superiorly while repairing the inguinal transversalis muscular floor  An onlay mesh was then secured to the tendon overlying the lateral pubic tubercle The external oblique fascia was closed with a running 3-0 PDS suture  Additional 0 25% Marcaine with epinephrine was injected over the ilioinguinal and iliohypogastric nerves  3-0 Vicryl subcutaneous suture was placed into the Parris's fascia   4-0 Monocryl suture was used for the subcuticular layer  Dermabond was then applied  The patient was then awakened from general anesthesia and transferred to the recovery room in stable condition  Sponge and instrument count correct ×2       I was present for the entire procedure    Patient Disposition:  PACU         SIGNATURE: Demarcus Veliz MD  DATE: November 11, 2022  TIME: 12:00 PM

## 2022-11-11 NOTE — ANESTHESIA POSTPROCEDURE EVALUATION
Post-Op Assessment Note    CV Status:  Stable    Pain management: adequate     Mental Status:  Alert and awake   Hydration Status:  Euvolemic   PONV Controlled:  Controlled   Airway Patency:  Patent      Post Op Vitals Reviewed: Yes      Staff: CRNA, Anesthesiologist         No complications documented      BP   108/56   Temp   97   Pulse  66   Resp   15   SpO2   100

## 2022-11-16 ENCOUNTER — IOP CHECK (OUTPATIENT)
Dept: URBAN - METROPOLITAN AREA CLINIC 6 | Facility: CLINIC | Age: 72
End: 2022-11-16

## 2022-11-16 DIAGNOSIS — H40.1411: ICD-10-CM

## 2022-11-16 PROCEDURE — 92012 INTRM OPH EXAM EST PATIENT: CPT

## 2022-11-16 ASSESSMENT — TONOMETRY
OD_IOP_MMHG: 28
OS_IOP_MMHG: 17

## 2022-11-16 ASSESSMENT — VISUAL ACUITY
OD_CC: 20/25
OS_CC: 20/25

## 2022-11-29 ENCOUNTER — OFFICE VISIT (OUTPATIENT)
Dept: SURGERY | Facility: CLINIC | Age: 72
End: 2022-11-29

## 2022-11-29 DIAGNOSIS — K40.90 LEFT INGUINAL HERNIA: Primary | ICD-10-CM

## 2022-11-29 NOTE — PROGRESS NOTES
Assessment/Plan:  Patient returns for follow-up visit  He is status post hernia repair  He feels well  He offers no complaints  All questions answered  There are no diagnoses linked to this encounter  Pathology: None sent      Postoperative restrictions reviewed  All questions answered  ______________________________________________________  HPI: Patient presents post operatively  Right inguinal hernia repair 11/11/2022  ROS:  General ROS: negative for - chills, fatigue, fever or night sweats, weight loss  Respiratory ROS: no cough, shortness of breath, or wheezing  Cardiovascular ROS: no chest pain or dyspnea on exertion  Genito-Urinary ROS: no dysuria, trouble voiding, or hematuria  Musculoskeletal ROS: negative for - gait disturbance, joint pain or muscle pain  Neurological ROS: no TIA or stroke symptoms  GI ROS: see HPI  Skin ROS: no new rashes or lesions   Lymphatic ROS: no new adenopathy noted by pt  GYN ROS: see HPI, no new GYN history or bleeding noted  Psy ROS: no new mental or behavioral disturbances         Patient Active Problem List   Diagnosis   • Left inguinal hernia   • Asthma   • Cardiomyopathy ([facility] [address] )   • Congestive heart failure (HCC)   • Decreased cardiac ejection fraction   • First degree AV block   • Hypertension   • Nonsustained ventricular tachycardia   • Palpitations   • Ventricular premature beats   • Vitamin D deficiency       Allergies:  Patient has no known allergies  Current Outpatient Medications:   •  albuterol (PROVENTIL HFA,VENTOLIN HFA) 90 mcg/act inhaler, 1 puff every 6 (six) hours as needed for wheezing, Disp: , Rfl:   •  ascorbic acid (VITAMIN C) 500 mg tablet, Take 500 mg by mouth daily  , Disp: , Rfl:   •  aspirin 81 MG tablet, Take 81 mg by mouth daily  , Disp: , Rfl:   •  Boswellia-Glucosamine-Vit D (OSTEO BI-FLEX ONE PER DAY PO), Take by mouth, Disp: , Rfl:   •  budesonide-formoterol (SYMBICORT) 160-4 5 mcg/act inhaler, 1 puff daily  , Disp: , Rfl:   •  Cholecalciferol (VITAMIN D3) 1000 UNITS CAPS, Take by mouth , Disp: , Rfl:   •  Coenzyme Q10 (CO Q 10) 100 MG CAPS, Take by mouth , Disp: , Rfl:   •  Farxiga 5 MG TABS, Take 5 mg by mouth daily, Disp: , Rfl:   •  fluticasone (FLONASE) 50 mcg/act nasal spray, 1 spray into each nostril daily as needed for rhinitis, Disp: , Rfl:   •  Fluticasone-Salmeterol (Advair) 250-50 mcg/dose inhaler, 1 puff 2 (two) times a day, Disp: , Rfl:   •  Levocarnitine 250 MG CAPS, Take 500 mg by mouth daily, Disp: , Rfl:   •  Lumigan 0 01 % ophthalmic drops, INSTILL 1 DROP INTO THE RIGHT EYE EVERY EVENING, Disp: , Rfl:   •  Magnesium Oxide 400 MG CAPS, Take 1 capsule by mouth daily, Disp: , Rfl:   •  metoprolol succinate (TOPROL-XL) 50 mg 24 hr tablet, Take 50 mg by mouth 2 (two) times a day  , Disp: , Rfl:   •  montelukast (SINGULAIR) 10 mg tablet, Take 10 mg by mouth daily at bedtime  , Disp: , Rfl:   •  Multiple Vitamin (MULTIVITAMIN) tablet, Take 1 tablet by mouth daily  , Disp: , Rfl:   •  MULTIPLE VITAMINS-MINERALS PO, Take 1 tablet by mouth daily, Disp: , Rfl:   •  Omega-3 Fatty Acids (FISH OIL PO), Take 1 capsule by mouth daily, Disp: , Rfl:   •  oxyCODONE-acetaminophen (PERCOCET) 5-325 mg per tablet, Take 1 tablet by mouth every 4 (four) hours as needed for moderate pain for up to 10 doses Max Daily Amount: 6 tablets, Disp: 10 tablet, Rfl: 0  •  sacubitril-valsartan (ENTRESTO)  MG TABS, Take 1 tablet by mouth 2 (two) times a day  , Disp: , Rfl:   •  spironolactone (ALDACTONE) 25 mg tablet, Take 25 mg by mouth daily  , Disp: , Rfl:   •  tamsulosin (FLOMAX) 0 4 mg, Take 1 capsule (0 4 mg total) by mouth daily with dinner Begin 5 days preop, Disp: 10 capsule, Rfl: 0  •  thiamine (VITAMIN B1) 100 mg tablet, Take 100 mg by mouth daily  , Disp: , Rfl:     Past Medical History:   Diagnosis Date   • Anesthesia complication     not appropriate for ambulatory surgery center due to bigemeny, bradycardia, and ectopy with hemodynamic sequela   • Asthma    • Heart failure [facility])        Past Surgical History:   Procedure Laterality Date   • NASAL POLYP SURGERY     • RI REPAIR ING HERNIA,5+Y/O,REDUCIBL Left 6/13/2016    Procedure: INGUINAL HERNIA REPAIR ;  Surgeon: Criselda Guillermo DO;  [facility];  Service: General   • RI REPAIR Olivia [address] 60 HERNIA,5+Y/O,REDUCIBL Right 11/11/2022    Procedure: REPAIR HERNIA INGUINAL;  Surgeon: Saintclair Closs, MD;  Location: [facility];  Service: General   • REPLACEMENT TOTAL KNEE Right        No family history on file  reports that he has quit smoking  He has never used smokeless tobacco  He reports that he does not drink alcohol and does not use drugs  PHYSICAL EXAM    There were no vitals taken for this visit      General: normal, cooperative, no distress  Abdominal: soft, nondistended or nontender  Incision: clean, dry, and intact and healing well      Saintclair Closs, MD    Date: 11/29/2022 Time: 9:16 AM

## 2022-12-15 ENCOUNTER — IOP CHECK (OUTPATIENT)
Dept: URBAN - METROPOLITAN AREA CLINIC 6 | Facility: CLINIC | Age: 72
End: 2022-12-15

## 2022-12-15 DIAGNOSIS — H40.1411: ICD-10-CM

## 2022-12-15 PROCEDURE — 92012 INTRM OPH EXAM EST PATIENT: CPT | Mod: 57

## 2022-12-15 PROCEDURE — 65855 TRABECULOPLASTY LASER SURG: CPT

## 2022-12-15 ASSESSMENT — VISUAL ACUITY
OS_CC: 20/20-2
OD_CC: 20/20-2

## 2022-12-15 ASSESSMENT — TONOMETRY: OD_IOP_MMHG: 21

## 2022-12-16 ENCOUNTER — 1 DAY POST-OP (OUTPATIENT)
Dept: URBAN - METROPOLITAN AREA CLINIC 6 | Facility: CLINIC | Age: 72
End: 2022-12-16

## 2022-12-16 DIAGNOSIS — Z98.890: ICD-10-CM

## 2022-12-16 PROCEDURE — 99024 POSTOP FOLLOW-UP VISIT: CPT

## 2022-12-16 ASSESSMENT — TONOMETRY
OD_IOP_MMHG: 11
OS_IOP_MMHG: 11

## 2022-12-16 ASSESSMENT — VISUAL ACUITY
OS_CC: 20/25
OD_CC: 20/25-2

## 2023-01-16 ENCOUNTER — FOLLOW UP (OUTPATIENT)
Dept: URBAN - METROPOLITAN AREA CLINIC 6 | Facility: CLINIC | Age: 73
End: 2023-01-16

## 2023-01-16 DIAGNOSIS — H40.1411: ICD-10-CM

## 2023-01-16 DIAGNOSIS — Z98.890: ICD-10-CM

## 2023-01-16 PROCEDURE — 92012 INTRM OPH EXAM EST PATIENT: CPT

## 2023-01-16 ASSESSMENT — TONOMETRY
OS_IOP_MMHG: 15
OD_IOP_MMHG: 16

## 2023-01-16 ASSESSMENT — VISUAL ACUITY
OS_CC: 20/25
OD_CC: 20/25-1

## 2023-03-20 ENCOUNTER — APPOINTMENT (OUTPATIENT)
Dept: LAB | Age: 73
End: 2023-03-20

## 2023-03-20 DIAGNOSIS — E55.9 AVITAMINOSIS D: ICD-10-CM

## 2023-03-20 DIAGNOSIS — I42.8 OBSCURE CARDIOMYOPATHY OF AFRICA (HCC): ICD-10-CM

## 2023-03-20 DIAGNOSIS — Z79.899 ENCOUNTER FOR LONG-TERM (CURRENT) USE OF OTHER MEDICATIONS: ICD-10-CM

## 2023-03-20 LAB
25(OH)D3 SERPL-MCNC: 48.7 NG/ML (ref 30–100)
ALBUMIN SERPL BCP-MCNC: 4.5 G/DL (ref 3.5–5)
ALP SERPL-CCNC: 41 U/L (ref 46–116)
ALT SERPL W P-5'-P-CCNC: 23 U/L (ref 12–78)
ANION GAP SERPL CALCULATED.3IONS-SCNC: 3 MMOL/L (ref 4–13)
AST SERPL W P-5'-P-CCNC: 15 U/L (ref 5–45)
BASOPHILS # BLD AUTO: 0.08 THOUSANDS/ÂΜL (ref 0–0.1)
BASOPHILS NFR BLD AUTO: 2 % (ref 0–1)
BILIRUB SERPL-MCNC: 1.42 MG/DL (ref 0.2–1)
BUN SERPL-MCNC: 28 MG/DL (ref 5–25)
CALCIUM SERPL-MCNC: 9.6 MG/DL (ref 8.3–10.1)
CHLORIDE SERPL-SCNC: 107 MMOL/L (ref 96–108)
CHOLEST SERPL-MCNC: 182 MG/DL
CO2 SERPL-SCNC: 28 MMOL/L (ref 21–32)
CREAT SERPL-MCNC: 0.86 MG/DL (ref 0.6–1.3)
EOSINOPHIL # BLD AUTO: 0.37 THOUSAND/ÂΜL (ref 0–0.61)
EOSINOPHIL NFR BLD AUTO: 8 % (ref 0–6)
ERYTHROCYTE [DISTWIDTH] IN BLOOD BY AUTOMATED COUNT: 13 % (ref 11.6–15.1)
GFR SERPL CREATININE-BSD FRML MDRD: 87 ML/MIN/1.73SQ M
GLUCOSE P FAST SERPL-MCNC: 113 MG/DL (ref 65–99)
HCT VFR BLD AUTO: 45 % (ref 36.5–49.3)
HDLC SERPL-MCNC: 54 MG/DL
HGB BLD-MCNC: 14.6 G/DL (ref 12–17)
IMM GRANULOCYTES # BLD AUTO: 0 THOUSAND/UL (ref 0–0.2)
IMM GRANULOCYTES NFR BLD AUTO: 0 % (ref 0–2)
LDLC SERPL CALC-MCNC: 116 MG/DL (ref 0–100)
LYMPHOCYTES # BLD AUTO: 1.51 THOUSANDS/ÂΜL (ref 0.6–4.47)
LYMPHOCYTES NFR BLD AUTO: 32 % (ref 14–44)
MCH RBC QN AUTO: 30.4 PG (ref 26.8–34.3)
MCHC RBC AUTO-ENTMCNC: 32.4 G/DL (ref 31.4–37.4)
MCV RBC AUTO: 94 FL (ref 82–98)
MONOCYTES # BLD AUTO: 0.71 THOUSAND/ÂΜL (ref 0.17–1.22)
MONOCYTES NFR BLD AUTO: 15 % (ref 4–12)
NEUTROPHILS # BLD AUTO: 2.12 THOUSANDS/ÂΜL (ref 1.85–7.62)
NEUTS SEG NFR BLD AUTO: 43 % (ref 43–75)
NONHDLC SERPL-MCNC: 128 MG/DL
NRBC BLD AUTO-RTO: 0 /100 WBCS
PLATELET # BLD AUTO: 282 THOUSANDS/UL (ref 149–390)
PMV BLD AUTO: 9.2 FL (ref 8.9–12.7)
POTASSIUM SERPL-SCNC: 4.5 MMOL/L (ref 3.5–5.3)
PROT SERPL-MCNC: 7.9 G/DL (ref 6.4–8.4)
RBC # BLD AUTO: 4.8 MILLION/UL (ref 3.88–5.62)
SODIUM SERPL-SCNC: 138 MMOL/L (ref 135–147)
TRIGL SERPL-MCNC: 58 MG/DL
WBC # BLD AUTO: 4.79 THOUSAND/UL (ref 4.31–10.16)

## 2023-04-20 ENCOUNTER — IOP CHECK (OUTPATIENT)
Dept: URBAN - METROPOLITAN AREA CLINIC 6 | Facility: CLINIC | Age: 73
End: 2023-04-20

## 2023-04-20 DIAGNOSIS — Z98.890: ICD-10-CM

## 2023-04-20 DIAGNOSIS — H40.1411: ICD-10-CM

## 2023-04-20 PROCEDURE — 92012 INTRM OPH EXAM EST PATIENT: CPT

## 2023-04-20 PROCEDURE — 92083 EXTENDED VISUAL FIELD XM: CPT

## 2023-04-20 ASSESSMENT — VISUAL ACUITY
OD_CC: 20/20-1
OS_CC: 20/25

## 2023-04-20 ASSESSMENT — TONOMETRY
OD_IOP_MMHG: 17
OS_IOP_MMHG: 17

## 2023-06-06 ENCOUNTER — RX CHECK (OUTPATIENT)
Dept: URBAN - METROPOLITAN AREA CLINIC 6 | Facility: CLINIC | Age: 73
End: 2023-06-06

## 2023-06-06 DIAGNOSIS — H52.4: ICD-10-CM

## 2023-06-06 PROCEDURE — 92015 DETERMINE REFRACTIVE STATE: CPT

## 2023-06-06 ASSESSMENT — VISUAL ACUITY
OD_CC: 20/25
OS_CC: J1-2
OS_CC: 20/25-1
OD_CC: J2

## 2023-10-02 ENCOUNTER — APPOINTMENT (OUTPATIENT)
Dept: LAB | Age: 73
End: 2023-10-02
Payer: COMMERCIAL

## 2023-10-02 DIAGNOSIS — Z12.5 SPECIAL SCREENING FOR MALIGNANT NEOPLASM OF PROSTATE: ICD-10-CM

## 2023-10-02 DIAGNOSIS — I42.8 OBSCURE CARDIOMYOPATHY OF AFRICA (HCC): ICD-10-CM

## 2023-10-02 DIAGNOSIS — Z79.899 ENCOUNTER FOR LONG-TERM (CURRENT) USE OF OTHER MEDICATIONS: ICD-10-CM

## 2023-10-02 LAB
25(OH)D3 SERPL-MCNC: 64.2 NG/ML (ref 30–100)
ALBUMIN SERPL BCP-MCNC: 4.5 G/DL (ref 3.5–5)
ALP SERPL-CCNC: 44 U/L (ref 34–104)
ALT SERPL W P-5'-P-CCNC: 13 U/L (ref 7–52)
ANION GAP SERPL CALCULATED.3IONS-SCNC: 8 MMOL/L
AST SERPL W P-5'-P-CCNC: 19 U/L (ref 13–39)
BASOPHILS # BLD AUTO: 0.07 THOUSANDS/ÂΜL (ref 0–0.1)
BASOPHILS NFR BLD AUTO: 1 % (ref 0–1)
BILIRUB SERPL-MCNC: 1.38 MG/DL (ref 0.2–1)
BUN SERPL-MCNC: 25 MG/DL (ref 5–25)
CALCIUM SERPL-MCNC: 9.5 MG/DL (ref 8.4–10.2)
CHLORIDE SERPL-SCNC: 102 MMOL/L (ref 96–108)
CO2 SERPL-SCNC: 29 MMOL/L (ref 21–32)
CREAT SERPL-MCNC: 0.88 MG/DL (ref 0.6–1.3)
EOSINOPHIL # BLD AUTO: 0.27 THOUSAND/ÂΜL (ref 0–0.61)
EOSINOPHIL NFR BLD AUTO: 5 % (ref 0–6)
ERYTHROCYTE [DISTWIDTH] IN BLOOD BY AUTOMATED COUNT: 12.8 % (ref 11.6–15.1)
EST. AVERAGE GLUCOSE BLD GHB EST-MCNC: 120 MG/DL
GFR SERPL CREATININE-BSD FRML MDRD: 86 ML/MIN/1.73SQ M
GLUCOSE P FAST SERPL-MCNC: 86 MG/DL (ref 65–99)
HBA1C MFR BLD: 5.8 %
HCT VFR BLD AUTO: 43.2 % (ref 36.5–49.3)
HGB BLD-MCNC: 14 G/DL (ref 12–17)
IMM GRANULOCYTES # BLD AUTO: 0 THOUSAND/UL (ref 0–0.2)
IMM GRANULOCYTES NFR BLD AUTO: 0 % (ref 0–2)
LDLC SERPL DIRECT ASSAY-MCNC: 111 MG/DL (ref 0–100)
LYMPHOCYTES # BLD AUTO: 1.22 THOUSANDS/ÂΜL (ref 0.6–4.47)
LYMPHOCYTES NFR BLD AUTO: 21 % (ref 14–44)
MAGNESIUM SERPL-MCNC: 2.3 MG/DL (ref 1.9–2.7)
MCH RBC QN AUTO: 30.4 PG (ref 26.8–34.3)
MCHC RBC AUTO-ENTMCNC: 32.4 G/DL (ref 31.4–37.4)
MCV RBC AUTO: 94 FL (ref 82–98)
MONOCYTES # BLD AUTO: 0.71 THOUSAND/ÂΜL (ref 0.17–1.22)
MONOCYTES NFR BLD AUTO: 12 % (ref 4–12)
NEUTROPHILS # BLD AUTO: 3.48 THOUSANDS/ÂΜL (ref 1.85–7.62)
NEUTS SEG NFR BLD AUTO: 61 % (ref 43–75)
NRBC BLD AUTO-RTO: 0 /100 WBCS
PLATELET # BLD AUTO: 270 THOUSANDS/UL (ref 149–390)
PMV BLD AUTO: 9.4 FL (ref 8.9–12.7)
POTASSIUM SERPL-SCNC: 4.2 MMOL/L (ref 3.5–5.3)
PROT SERPL-MCNC: 7.6 G/DL (ref 6.4–8.4)
PSA SERPL-MCNC: 0.29 NG/ML (ref 0–4)
RBC # BLD AUTO: 4.6 MILLION/UL (ref 3.88–5.62)
SODIUM SERPL-SCNC: 139 MMOL/L (ref 135–147)
WBC # BLD AUTO: 5.75 THOUSAND/UL (ref 4.31–10.16)

## 2023-10-02 PROCEDURE — 83735 ASSAY OF MAGNESIUM: CPT

## 2023-10-02 PROCEDURE — 85025 COMPLETE CBC W/AUTO DIFF WBC: CPT

## 2023-10-02 PROCEDURE — 84153 ASSAY OF PSA TOTAL: CPT

## 2023-10-02 PROCEDURE — 36415 COLL VENOUS BLD VENIPUNCTURE: CPT

## 2023-10-02 PROCEDURE — 83721 ASSAY OF BLOOD LIPOPROTEIN: CPT

## 2023-10-02 PROCEDURE — 80053 COMPREHEN METABOLIC PANEL: CPT

## 2023-10-02 PROCEDURE — 82306 VITAMIN D 25 HYDROXY: CPT

## 2023-10-02 PROCEDURE — 83036 HEMOGLOBIN GLYCOSYLATED A1C: CPT

## 2023-10-04 ENCOUNTER — ESTABLISHED COMPREHENSIVE EXAM (OUTPATIENT)
Dept: URBAN - METROPOLITAN AREA CLINIC 6 | Facility: CLINIC | Age: 73
End: 2023-10-04

## 2023-10-04 DIAGNOSIS — H25.813: ICD-10-CM

## 2023-10-04 DIAGNOSIS — Z98.890: ICD-10-CM

## 2023-10-04 DIAGNOSIS — H40.1411: ICD-10-CM

## 2023-10-04 PROCEDURE — 92133 CPTRZD OPH DX IMG PST SGM ON: CPT

## 2023-10-04 PROCEDURE — 92020 GONIOSCOPY: CPT

## 2023-10-04 PROCEDURE — 92202 OPSCPY EXTND ON/MAC DRAW: CPT

## 2023-10-04 PROCEDURE — 92014 COMPRE OPH EXAM EST PT 1/>: CPT

## 2023-10-04 ASSESSMENT — VISUAL ACUITY
OD_CC: 20/25-1
OS_CC: 20/25-2

## 2023-10-04 ASSESSMENT — TONOMETRY
OD_IOP_MMHG: 19
OS_IOP_MMHG: 16

## 2024-04-04 ENCOUNTER — IOP CHECK (OUTPATIENT)
Dept: URBAN - METROPOLITAN AREA CLINIC 6 | Facility: CLINIC | Age: 74
End: 2024-04-04

## 2024-04-04 DIAGNOSIS — H40.1411: ICD-10-CM

## 2024-04-04 PROCEDURE — 92012 INTRM OPH EXAM EST PATIENT: CPT

## 2024-04-04 ASSESSMENT — TONOMETRY
OD_IOP_MMHG: 13
OS_IOP_MMHG: 17

## 2024-04-04 ASSESSMENT — VISUAL ACUITY
OS_CC: 20/30
OD_CC: 20/30

## 2024-04-18 ENCOUNTER — APPOINTMENT (OUTPATIENT)
Dept: LAB | Age: 74
End: 2024-04-18
Payer: COMMERCIAL

## 2024-04-18 DIAGNOSIS — I42.8 NONOBSTRUCTIVE CARDIOMYOPATHY (HCC): ICD-10-CM

## 2024-04-18 DIAGNOSIS — E78.49 OTHER HYPERLIPIDEMIA: ICD-10-CM

## 2024-04-18 DIAGNOSIS — Z79.899 PHARMACOLOGIC THERAPY: ICD-10-CM

## 2024-04-18 DIAGNOSIS — R73.09 IMPAIRED GLUCOSE TOLERANCE TEST: ICD-10-CM

## 2024-04-18 LAB
ALBUMIN SERPL BCP-MCNC: 4.3 G/DL (ref 3.5–5)
ALP SERPL-CCNC: 35 U/L (ref 34–104)
ALT SERPL W P-5'-P-CCNC: 13 U/L (ref 7–52)
ANION GAP SERPL CALCULATED.3IONS-SCNC: 10 MMOL/L (ref 4–13)
AST SERPL W P-5'-P-CCNC: 20 U/L (ref 13–39)
BACTERIA UR QL AUTO: ABNORMAL /HPF
BASOPHILS # BLD AUTO: 0.08 THOUSANDS/ÂΜL (ref 0–0.1)
BASOPHILS NFR BLD AUTO: 1 % (ref 0–1)
BILIRUB SERPL-MCNC: 1.66 MG/DL (ref 0.2–1)
BILIRUB UR QL STRIP: NEGATIVE
BUN SERPL-MCNC: 23 MG/DL (ref 5–25)
CALCIUM SERPL-MCNC: 9.5 MG/DL (ref 8.4–10.2)
CHLORIDE SERPL-SCNC: 102 MMOL/L (ref 96–108)
CHOLEST SERPL-MCNC: 178 MG/DL
CLARITY UR: CLEAR
CO2 SERPL-SCNC: 27 MMOL/L (ref 21–32)
COLOR UR: YELLOW
CREAT SERPL-MCNC: 0.81 MG/DL (ref 0.6–1.3)
EOSINOPHIL # BLD AUTO: 0.33 THOUSAND/ÂΜL (ref 0–0.61)
EOSINOPHIL NFR BLD AUTO: 6 % (ref 0–6)
ERYTHROCYTE [DISTWIDTH] IN BLOOD BY AUTOMATED COUNT: 12.7 % (ref 11.6–15.1)
EST. AVERAGE GLUCOSE BLD GHB EST-MCNC: 123 MG/DL
GFR SERPL CREATININE-BSD FRML MDRD: 88 ML/MIN/1.73SQ M
GLUCOSE P FAST SERPL-MCNC: 80 MG/DL (ref 65–99)
GLUCOSE UR STRIP-MCNC: ABNORMAL MG/DL
HBA1C MFR BLD: 5.9 %
HCT VFR BLD AUTO: 45.1 % (ref 36.5–49.3)
HDLC SERPL-MCNC: 56 MG/DL
HGB BLD-MCNC: 14.7 G/DL (ref 12–17)
HGB UR QL STRIP.AUTO: NEGATIVE
IMM GRANULOCYTES # BLD AUTO: 0 THOUSAND/UL (ref 0–0.2)
IMM GRANULOCYTES NFR BLD AUTO: 0 % (ref 0–2)
KETONES UR STRIP-MCNC: ABNORMAL MG/DL
LDLC SERPL CALC-MCNC: 110 MG/DL (ref 0–100)
LDLC SERPL DIRECT ASSAY-MCNC: 128 MG/DL (ref 0–100)
LEUKOCYTE ESTERASE UR QL STRIP: NEGATIVE
LYMPHOCYTES # BLD AUTO: 1.35 THOUSANDS/ÂΜL (ref 0.6–4.47)
LYMPHOCYTES NFR BLD AUTO: 24 % (ref 14–44)
MAGNESIUM SERPL-MCNC: 2.4 MG/DL (ref 1.9–2.7)
MCH RBC QN AUTO: 31.3 PG (ref 26.8–34.3)
MCHC RBC AUTO-ENTMCNC: 32.6 G/DL (ref 31.4–37.4)
MCV RBC AUTO: 96 FL (ref 82–98)
MONOCYTES # BLD AUTO: 0.82 THOUSAND/ÂΜL (ref 0.17–1.22)
MONOCYTES NFR BLD AUTO: 15 % (ref 4–12)
MUCOUS THREADS UR QL AUTO: ABNORMAL
NEUTROPHILS # BLD AUTO: 3.08 THOUSANDS/ÂΜL (ref 1.85–7.62)
NEUTS SEG NFR BLD AUTO: 54 % (ref 43–75)
NITRITE UR QL STRIP: NEGATIVE
NON-SQ EPI CELLS URNS QL MICRO: ABNORMAL /HPF
NONHDLC SERPL-MCNC: 122 MG/DL
NRBC BLD AUTO-RTO: 0 /100 WBCS
PH UR STRIP.AUTO: 6 [PH]
PLATELET # BLD AUTO: 278 THOUSANDS/UL (ref 149–390)
PMV BLD AUTO: 9.7 FL (ref 8.9–12.7)
POTASSIUM SERPL-SCNC: 4.3 MMOL/L (ref 3.5–5.3)
PROT SERPL-MCNC: 7.2 G/DL (ref 6.4–8.4)
PROT UR STRIP-MCNC: ABNORMAL MG/DL
RBC # BLD AUTO: 4.69 MILLION/UL (ref 3.88–5.62)
RBC #/AREA URNS AUTO: ABNORMAL /HPF
SODIUM SERPL-SCNC: 139 MMOL/L (ref 135–147)
SP GR UR STRIP.AUTO: 1.02 (ref 1–1.03)
TRIGL SERPL-MCNC: 62 MG/DL
UROBILINOGEN UR STRIP-ACNC: <2 MG/DL
WBC # BLD AUTO: 5.66 THOUSAND/UL (ref 4.31–10.16)
WBC #/AREA URNS AUTO: ABNORMAL /HPF

## 2024-04-18 PROCEDURE — 85025 COMPLETE CBC W/AUTO DIFF WBC: CPT

## 2024-04-18 PROCEDURE — 80053 COMPREHEN METABOLIC PANEL: CPT

## 2024-04-18 PROCEDURE — 83721 ASSAY OF BLOOD LIPOPROTEIN: CPT

## 2024-04-18 PROCEDURE — 81001 URINALYSIS AUTO W/SCOPE: CPT

## 2024-04-18 PROCEDURE — 80061 LIPID PANEL: CPT

## 2024-04-18 PROCEDURE — 36415 COLL VENOUS BLD VENIPUNCTURE: CPT

## 2024-04-18 PROCEDURE — 83036 HEMOGLOBIN GLYCOSYLATED A1C: CPT

## 2024-04-18 PROCEDURE — 83735 ASSAY OF MAGNESIUM: CPT

## 2024-05-07 ENCOUNTER — OFFICE VISIT (OUTPATIENT)
Dept: CARDIOLOGY CLINIC | Facility: CLINIC | Age: 74
End: 2024-05-07
Payer: COMMERCIAL

## 2024-05-07 VITALS
WEIGHT: 161.3 LBS | HEIGHT: 72 IN | DIASTOLIC BLOOD PRESSURE: 66 MMHG | SYSTOLIC BLOOD PRESSURE: 100 MMHG | BODY MASS INDEX: 21.85 KG/M2 | HEART RATE: 58 BPM

## 2024-05-07 DIAGNOSIS — I47.29 NSVT (NONSUSTAINED VENTRICULAR TACHYCARDIA) (HCC): Primary | ICD-10-CM

## 2024-05-07 DIAGNOSIS — I50.9 CONGESTIVE HEART FAILURE, UNSPECIFIED HF CHRONICITY, UNSPECIFIED HEART FAILURE TYPE (HCC): ICD-10-CM

## 2024-05-07 DIAGNOSIS — I49.3 PVCS (PREMATURE VENTRICULAR CONTRACTIONS): ICD-10-CM

## 2024-05-07 PROCEDURE — 93000 ELECTROCARDIOGRAM COMPLETE: CPT | Performed by: INTERNAL MEDICINE

## 2024-05-07 PROCEDURE — 99214 OFFICE O/P EST MOD 30 MIN: CPT | Performed by: INTERNAL MEDICINE

## 2024-05-07 PROCEDURE — 93242 EXT ECG>48HR<7D RECORDING: CPT | Performed by: INTERNAL MEDICINE

## 2024-05-07 RX ORDER — VERICIGUAT 5 MG/1
1 TABLET, FILM COATED ORAL DAILY
COMMUNITY

## 2024-05-07 NOTE — PROGRESS NOTES
EPS Consultation/New Patient Evaluation   [facility]  [facility] [address], [place] [address]    Name: Eliezer Thompson  : 1950  MRN: [id number]      Assessment/Plan:  NSVT  Asymptomatic NSVT   Maintained on metoprolol succinate 50mg qAM and 75mg qPM  2023 Holter:  NSR dominant rhythm  Avg HR 67bpm (lowest 44bpm, highest 94bpm)  No high-grade block  8 salvos of VT (longest 8 beats at 103bpm, fastest 4 beats at 171bpm) (increased from 4 salvos VT on 2022 holter)  NM Stress 10/2023:  Stress ECG: Nonspecific ST-T change from LBBB. Arrhythmias during stress: occasional PVCs.   NM: There is a moderately severe defect in the apical to basal inferior segments. However, there is also interference from adjacent GI structures and diaphragmatic attenuation in this area. This inferior large defect is worse with stress. Therefore, cannot rule out ischemia.   ECHO 2023:  EF 45-50%, mild LA enlargement, mildly myxomatous MV w/ mild MR  PVC's  Asymptomatic PVC's   2023 Holter:  14.3% PVC density (increased from 3% on 2022 holter)  LBBB  10/2023 NM stress noted new LBBB  4. CHF  EF 45-50% on latest 2023 ECHO (improved from 40-45% on 2022 ECHO)   NYHA II  Appears euvolemic in office today  Receiving GDMT with entresto and metoprolol  5. HLD  2024 Lipid Panel:  Cholesterol 178, Triglycerides 62, HDL 56,         Discussion/Plan:    Patient reports he feels well and is asymptomatic in regards to his PVC and NSVT. Overall he reports feeling well from a cardiac standpoint, has good energy, and is able to exert himself without difficulty.    Noted his increase in PVC and NSVT burden on latest Holter monitor. His latest EF 2023 has improved around 5% from the prior study in .     Patient reports he was very stressed during his latest holter as he almost lost some children in a card accident. This may have contributed to his increased  burden.    He has borderline soft BP and low HR in the 50's in office today this may limit future uptitration of medications if desired. PPM may need to be considered in the future if further uptitration is desired.    Plan to repeat ECHO and will obtain a 1 week zio monitor for repeat evaluation.    No medication changes required at this time.    Patient has been instructed to follow up in our EP office in 6 months or as needed. He will call our office with any questions or concerns in the meantime.    Rhythm History:   Atrial fibrillation:      Atrial flutter:      SVT:      VT/VF/PVC:     Device history:   Pacemaker:     Defibrillator:     BIV PPM:     BIV ICD:     ILR:    HPI:   Eliezer Thompson is a 74 y.o. male with a PMH of NSVT, PVC, Cardiomyopathy, and HLD.     He has a history of NSVT and PVC and previously saw Dr. Cox in 2018. At that time his metoprolol was increased and digoxin was dc'd due to reported side effect. Afterwards he established with Dr. Horne for which he followed with for the past several years. Given Dr. Horne is retiring he was referred back to Dr. Cox for further management.    He affirms being told that recent holter monitoring in 09/2023 showed increased PVC and NSVT burden; however he states he is asymptomatic in this regard. During that holter monitor he does report extreme stress as he almost lost some of his children in a car accident. He attributes this increased burden due to his stress at the time. He states he currently feels well and is able to exert himself without difficulty at home. He remains active and enjoy's shooting bow and arrow, but notes that shoulder and hand pain sometimes limit this. Overall he states he feels well from a cardiac standpoint and denies chest pain (exertional or otherwise), SOB, dizzy/lightheadedness, syncope/presyncope, palpitation, and leg swelling, with the remainder of the ROS negative as below.      EKG: SR w/ LAFB and incomplete LBBB  w/ 1st degree AV block, , , w/ ventricular rate 58bpm       Review of Systems   Constitutional:  Negative for activity change, appetite change, chills, fatigue and fever.   HENT:  Negative for nosebleeds.    Respiratory:  Negative for chest tightness and shortness of breath.    Cardiovascular:  Negative for chest pain, palpitations and leg swelling.   Musculoskeletal:  Positive for arthralgias (right shoulder; chronic).   Neurological:  Negative for dizziness, syncope, weakness and light-headedness.       Objective:     Vitals: Blood pressure 100/66, pulse 58, height 6' (1.829 m), weight 73.2 kg (161 lb 4.8 oz)., Body mass index is 21.88 kg/m².,        Physical Exam:   Physical Exam  Constitutional:       General: He is not in acute distress.     Appearance: Normal appearance. He is not toxic-appearing.   HENT:      Head: Normocephalic and atraumatic.   Eyes:      General:         Right eye: No discharge.         Left eye: No discharge.   Cardiovascular:      Rate and Rhythm: Normal rate and regular rhythm.      Pulses: Normal pulses.   Pulmonary:      Effort: Pulmonary effort is normal.      Breath sounds: Normal breath sounds.   Musculoskeletal:      Right lower leg: No edema.      Left lower leg: No edema.   Skin:     General: Skin is warm and dry.      Capillary Refill: Capillary refill takes less than 2 seconds.   Neurological:      Mental Status: He is alert.            Medications:      Current Outpatient Medications:     albuterol (PROVENTIL HFA,VENTOLIN HFA) 90 mcg/act inhaler, 1 puff every 6 (six) hours as needed for wheezing, Disp: , Rfl:     ascorbic acid (VITAMIN C) 500 mg tablet, Take 500 mg by mouth daily., Disp: , Rfl:     aspirin 81 MG tablet, Take 81 mg by mouth daily., Disp: , Rfl:     Boswellia-Glucosamine-Vit D (OSTEO BI-FLEX ONE PER DAY PO), Take by mouth, Disp: , Rfl:     budesonide-formoterol (SYMBICORT) 160-4.5 mcg/act inhaler, 1 puff daily  , Disp: , Rfl:     Cholecalciferol  (VITAMIN D3) 1000 UNITS CAPS, Take by mouth., Disp: , Rfl:     Coenzyme Q10 (CO Q 10) 100 MG CAPS, Take by mouth., Disp: , Rfl:     Farxiga 5 MG TABS, Take 5 mg by mouth daily, Disp: , Rfl:     fluticasone (FLONASE) 50 mcg/act nasal spray, 1 spray into each nostril daily as needed for rhinitis, Disp: , Rfl:     Levocarnitine 250 MG CAPS, Take 500 mg by mouth daily, Disp: , Rfl:     Lumigan 0.01 % ophthalmic drops, INSTILL 1 DROP INTO THE RIGHT EYE EVERY EVENING, Disp: , Rfl:     Magnesium Oxide 400 MG CAPS, Take 1 capsule by mouth daily, Disp: , Rfl:     metoprolol succinate (TOPROL-XL) 50 mg 24 hr tablet, Take 50 mg by mouth 2 (two) times a day Take 1.5 tabs in the morning and 1 tab in the evening, Disp: , Rfl:     montelukast (SINGULAIR) 10 mg tablet, Take 10 mg by mouth daily at bedtime., Disp: , Rfl:     Multiple Vitamin (MULTIVITAMIN) tablet, Take 1 tablet by mouth daily., Disp: , Rfl:     MULTIPLE VITAMINS-MINERALS PO, Take 1 tablet by mouth daily, Disp: , Rfl:     Omega-3 Fatty Acids (FISH OIL PO), Take 1 capsule by mouth daily, Disp: , Rfl:     sacubitril-valsartan (ENTRESTO)  MG TABS, Take 1 tablet by mouth 2 (two) times a day  , Disp: , Rfl:     spironolactone (ALDACTONE) 25 mg tablet, Take 25 mg by mouth daily., Disp: , Rfl:     thiamine (VITAMIN B1) 100 mg tablet, Take 100 mg by mouth daily., Disp: , Rfl:     Verquvo 5 MG TABS, Take 1 tablet by mouth daily, Disp: , Rfl:     Fluticasone-Salmeterol (Advair) 250-50 mcg/dose inhaler, 1 puff 2 (two) times a day (Patient not taking: Reported on 5/7/2024), Disp: , Rfl:     oxyCODONE-acetaminophen (PERCOCET) 5-325 mg per tablet, Take 1 tablet by mouth every 4 (four) hours as needed for moderate pain for up to 10 doses Max Daily Amount: 6 tablets, Disp: 10 tablet, Rfl: 0    tamsulosin (FLOMAX) 0.4 mg, Take 1 capsule (0.4 mg total) by mouth daily with dinner Begin 5 days preop, Disp: 10 capsule, Rfl: 0       Historical Information   Past Medical History:    Diagnosis Date    Anesthesia complication     not appropriate for ambulatory surgery center due to bigemeny, bradycardia, and ectopy with hemodynamic sequela    Asthma     Heart failure (HCC)        Past Surgical History:   Procedure Laterality Date    NASAL POLYP SURGERY      AR RPR 1ST INGUN HRNA AGE 5 YRS/> REDUCIBLE Left 6/13/2016    Procedure: INGUINAL HERNIA REPAIR ;  Surgeon: Bill Horton DO;  Location[facility];  Service: General    AR RPR 1ST INGUN HRNA AGE 5 YRS/> REDUCIBLE Right 11/11/2022    Procedure: REPAIR HERNIA INGUINAL;  Surgeon: Cong Galan MD;  Location: [facility] MAIN OR;  Service: General    REPLACEMENT TOTAL KNEE Right        Social History     Substance and Sexual Activity   Alcohol Use No     Social History     Substance and Sexual Activity   Drug Use No     Social History     Tobacco Use   Smoking Status Former   Smokeless Tobacco Never   Tobacco Comments    30 years ago       History reviewed. No pertinent family history.      Labs & Results:  Below is the patient's most recent value for Albumin, ALT, AST, BUN, Calcium, Chloride, Cholesterol, CO2, Creatinine, GFR, Glucose, HDL, Hematocrit, Hemoglobin, Hemoglobin A1C, LDL, Magnesium, Phosphorus, Platelets, Potassium, PSA, Sodium, Triglycerides, and WBC.   Lab Results   Component Value Date    ALT 13 04/18/2024    AST 20 04/18/2024    BUN 23 04/18/2024    CALCIUM 9.5 04/18/2024     04/18/2024    CHOL 173 04/18/2014    CO2 27 04/18/2024    CREATININE 0.81 04/18/2024    HDL 56 04/18/2024    HCT 45.1 04/18/2024    HGB 14.7 04/18/2024    HGBA1C 5.9 (H) 04/18/2024    MG 2.4 04/18/2024     04/18/2024    K 4.3 04/18/2024    PSA 0.29 10/02/2023     04/24/2015    TRIG 62 04/18/2024    WBC 5.66 04/18/2024     Note: for a comprehensive list of the patient's lab results, access the Results Review activity.

## 2024-05-15 NOTE — PROGRESS NOTES
Cardiology and Heart Failure Clinic Note    Eliezer Thompson 74 y.o. male   MRN: [id number]  Encounter: [id number]        Assessment / Plan:    # non-ischemic cardiomyopathy  # HF imp EF    ETIOLOGY:  - most recent EF 45% (dx 2001; EF had been much lower in the past).     - repeat echo scheduled by EP for May 31st  - non-ischemic.   MRI 2017 -  thin intramyocardial strip of LGE in septum.  - check genetic testing if cost acceptable     VOLUME:  - not on diuretic  - exam  - euvolemic    GDMT:  - toprol 50 AM,  75 PM  - entresto  BID  - spironlactone 25 QD  - farxiga 5 QD  - vericiguat (verquvo) 5mg QD  (started by Dr Castellon)    DEVICE:  - Not indicated, EF > 35%      # NSVT / PVCs  Not symptomatic  On magnesium  On toprol  Follows with Dr Cox of EP.    He is repeating a zio.  Notes indicate he would consider PVC ablation if burden increases or if EF drops going forward.    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  Briefly,     Euvoelmic.  No med changes.  Look into genetic testing.                Reason For Visit / Chief Complaint:  New patient - requested by Dr Cox for abnormal EF    HPI:   Eliezer Thompson is a 74 y.o.  male with history as noted in the problem list and further detailed in the above assessment and plan.    New patient - requested by Dr Cox for abnormal EF    As above, the patient has a longstanding history of nonischemic cardiomyopathy.  The EF was quite low at time of diagnosis but has subsequently improved and most recently has been about 45%.  Has followed with Dr Castellon who is retiring.  He also has PVCs and NSVT on heart monitor.  Follows with EP for this.    Today, the patient reports  - feels really well.   No SOB.  No HUGGINS.  No CP.   No palpitations.  No syncope.  No leg edema.   No orthopnea or PND.      Retired.   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  .  Has a boxer mix dog.     Former cigar smoker.  No ETOH.   No drugs.        Cardiac Imaging personally reviewed:  EKG 5-7-24  Sinus bradycardia at 58 bpm.  1st degree AVB.  Possible LAFB.   ms  PRWP, cannot exclude old anterior MI       Holter or event monitor Holter 2023  14% PVCs  8 runs NSVT       Echo   Oct 2022  EF 45%  Mildly myxomatous mitral valve with mild MR    Nov 2023  EF - 45-50%   Mildly myxomatous mitral valve with mild MR       DESTINY    Cardiac MRI 2017  EF 43%  Thin strip of intramyocardial fibrosis of the basal to mid septum.          Stress testing Stress echo 2022  No ischemia    Nuclear stress Oct 2023  Although there are extracardiac artifacts, inferior ischemia cannot be excluded        Coronary CTA or [facility]    RHC    CPET              Patient Active Problem List    Diagnosis Date Noted   • Nonrheumatic mitral valve regurgitation 05/16/2024   • Decreased cardiac ejection fraction 01/24/2022   • Vitamin D deficiency 01/24/2022   • NICM (nonischemic cardiomyopathy) ([facility]) 01/18/2021   • PVCs (premature ventricular contractions) 01/18/2021   • Hypertension 06/21/2017   • NSVT (nonsustained ventricular tachycardia) ([facility]) 06/21/2017   • Palpitations 06/21/2017   • Asthma 06/19/2017   • Heart failure with improved ejection fraction (HFimpEF) ([facility]) 06/19/2017   • First degree AV block 06/19/2017   • Left inguinal hernia 06/13/2016       Past Medical History:   Diagnosis Date   • Anesthesia complication     not appropriate for ambulatory surgery center due to bigemeny, bradycardia, and ectopy with hemodynamic sequela   • Asthma    • Heart failure ([facility])        Review of Systems   Constitutional:  Negative for chills and fever.   HENT:  Negative for nosebleeds.    Gastrointestinal:  Negative for abdominal distention, abdominal pain and blood in stool.   Neurological:  Negative for dizziness and syncope.    Psychiatric/Behavioral:  Negative for confusion.    14-point ROS completed and negative except as stated above and/or in the HPI.    No Known Allergies    Current Outpatient Medications   Medication Instructions   • albuterol (PROVENTIL HFA,VENTOLIN HFA) 90 mcg/act inhaler 1 puff, Every 6 hours PRN   • ascorbic acid (VITAMIN C) 500 mg, Oral, Daily   • aspirin 81 mg, Oral, Daily   • Boswellia-Glucosamine-Vit D (OSTEO BI-FLEX ONE PER DAY PO) Oral   • budesonide-formoterol (SYMBICORT) 160-4.5 mcg/act inhaler 1 puff, Daily   • Cholecalciferol (VITAMIN D3) 1000 UNITS CAPS Oral   • Coenzyme Q10 (CO Q 10) 100 MG CAPS Oral   • Farxiga 5 mg, Oral, Daily   • fluticasone (FLONASE) 50 mcg/act nasal spray 1 spray, Nasal, Daily PRN   • Levocarnitine 500 mg, Oral, Daily   • Lumigan 0.01 % ophthalmic drops INSTILL 1 DROP INTO THE RIGHT EYE EVERY EVENING   • Magnesium Oxide 400 MG CAPS 1 capsule, Oral, Daily   • metoprolol succinate (TOPROL-XL) 50 mg, Oral, 2 times daily, Take 1.5 tabs in the morning and 1 tab in the evening   • montelukast (SINGULAIR) 10 mg, Oral, Daily at bedtime   • Multiple Vitamin (MULTIVITAMIN) tablet 1 tablet, Oral, Daily   • Omega-3 Fatty Acids (FISH OIL PO) 1 capsule, Oral, Daily   • sacubitril-valsartan (ENTRESTO)  MG TABS 1 tablet, Oral, 2 times daily   • spironolactone (ALDACTONE) 25 mg, Oral, Daily   • thiamine (VITAMIN B1) 100 mg, Oral, Daily   • Verquvo 5 MG TABS 1 tablet, Oral, Daily       Social History     Socioeconomic History   • Marital status:      Spouse name: Not on file   • Number of children: Not on file   • Years of education: Not on file   • Highest education level: Not on file   Occupational History   • Not on file   Tobacco Use   • Smoking status: Former   • Smokeless tobacco: Never   • Tobacco comments:     30 years ago   Vaping Use   • Vaping status: Never Used   Substance and Sexual Activity   • Alcohol use: No   • Drug use: No   • Sexual activity: Not on file   Other  Topics Concern   • Not on file   Social History Narrative   • Not on file     Social Determinants of Health     Financial Resource Strain: Not on file   Food Insecurity: Not on file   Transportation Needs: Not on file   Physical Activity: Not on file   Stress: Not on file   Social Connections: Not on file   Intimate Partner Violence: Not on file   Housing Stability: Not on file       Family History   Problem Relation Age of Onset   • Heart failure Father         in his older age   • Heart attack Maternal Grandfather        Physical Exam:  Blood pressure 98/50, pulse 59, height 6' (1.829 m), weight 72.9 kg (160 lb 11.2 oz), SpO2 95%.  Body mass index is 21.79 kg/m².  Wt Readings from Last 3 Encounters:   05/16/24 72.9 kg (160 lb 11.2 oz)   05/07/24 73.2 kg (161 lb 4.8 oz)   11/10/22 73 kg (161 lb)     Physical Exam  Vitals reviewed.   Constitutional:       General: He is not in acute distress.     Appearance: He is not toxic-appearing.   HENT:      Head: Normocephalic and atraumatic.   Eyes:      General: No scleral icterus.     Conjunctiva/sclera: Conjunctivae normal.   Neck:      Vascular: No carotid bruit.      Comments: JVP is not elevated  Cardiovascular:      Rate and Rhythm: Regular rhythm. Bradycardia present.      Heart sounds: Murmur (very soft HSM at apex) heard.      No friction rub. No gallop.      Comments: Distant heart sounds  Pulmonary:      Breath sounds: Normal breath sounds. No wheezing, rhonchi or rales.   Abdominal:      General: There is no distension.      Palpations: Abdomen is soft.      Tenderness: There is no abdominal tenderness. There is no guarding.   Musculoskeletal:      Right lower leg: No edema.      Left lower leg: No edema.   Skin:     Coloration: Skin is not jaundiced or pale.      Findings: No erythema.   Neurological:      Mental Status: He is alert. Mental status is at baseline.   Psychiatric:         Mood and Affect: Mood normal.         Behavior: Behavior normal.          Labs & Results:  Lab Results   Component Value Date    SODIUM 139 04/18/2024    K 4.3 04/18/2024     04/18/2024    CO2 27 04/18/2024    BUN 23 04/18/2024    CREATININE 0.81 04/18/2024    GLUC 77 09/28/2022    CALCIUM 9.5 04/18/2024         Thank you for the opportunity to participate in the care of this patient.    Mika Valente MD, [facility]  Advanced Heart Failure and Transplant Cardiologist  Director of Cardio-Oncology  [facility]

## 2024-05-16 ENCOUNTER — CONSULT (OUTPATIENT)
Dept: CARDIOLOGY CLINIC | Facility: CLINIC | Age: 74
End: 2024-05-16
Payer: COMMERCIAL

## 2024-05-16 VITALS
HEIGHT: 72 IN | SYSTOLIC BLOOD PRESSURE: 98 MMHG | HEART RATE: 59 BPM | OXYGEN SATURATION: 95 % | BODY MASS INDEX: 21.77 KG/M2 | WEIGHT: 160.7 LBS | DIASTOLIC BLOOD PRESSURE: 50 MMHG

## 2024-05-16 DIAGNOSIS — I49.3 PVC (PREMATURE VENTRICULAR CONTRACTION): ICD-10-CM

## 2024-05-16 DIAGNOSIS — I42.8 NICM (NONISCHEMIC CARDIOMYOPATHY) (HCC): Primary | ICD-10-CM

## 2024-05-16 DIAGNOSIS — I50.32 HEART FAILURE WITH IMPROVED EJECTION FRACTION (HFIMPEF) (HCC): ICD-10-CM

## 2024-05-16 DIAGNOSIS — I10 PRIMARY HYPERTENSION: ICD-10-CM

## 2024-05-16 DIAGNOSIS — I47.29 NSVT (NONSUSTAINED VENTRICULAR TACHYCARDIA) (HCC): ICD-10-CM

## 2024-05-16 DIAGNOSIS — I34.0 NONRHEUMATIC MITRAL VALVE REGURGITATION: ICD-10-CM

## 2024-05-16 PROCEDURE — 99204 OFFICE O/P NEW MOD 45 MIN: CPT | Performed by: INTERNAL MEDICINE

## 2024-05-20 ENCOUNTER — DOCUMENTATION (OUTPATIENT)
Dept: CARDIOLOGY CLINIC | Facility: CLINIC | Age: 74
End: 2024-05-20

## 2024-05-22 ENCOUNTER — CLINICAL SUPPORT (OUTPATIENT)
Dept: CARDIOLOGY CLINIC | Facility: CLINIC | Age: 74
End: 2024-05-22
Payer: COMMERCIAL

## 2024-05-22 DIAGNOSIS — I49.3 PVCS (PREMATURE VENTRICULAR CONTRACTIONS): ICD-10-CM

## 2024-05-22 DIAGNOSIS — I47.29 NSVT (NONSUSTAINED VENTRICULAR TACHYCARDIA) (HCC): ICD-10-CM

## 2024-05-22 PROCEDURE — 93244 EXT ECG>48HR<7D REV&INTERPJ: CPT | Performed by: INTERNAL MEDICINE

## 2024-05-23 NOTE — RESULT ENCOUNTER NOTE
Patient had a min HR of 47 bpm, max HR of 152 bpm, and avg HR of 65 bpm. Predominant underlying rhythm was Sinus Rhythm. Bundle Branch Block/IVCD was present. 3 Ventricular Tachycardia runs occurred, the run with the fastest interval lasting 4 beats with a max rate of 152 bpm, the longest lasting 5 beats with an avg rate of 103 bpm. 5 Supraventricular Tachycardia runs occurred, the run with the fastest interval lasting 6 beats with a max rate of 121 bpm, the longest lasting 9 beats with an avg rate of 90 bpm. Isolated SVEs were rare (<1.0%), SVE Couplets were rare (<1.0%), and no SVE Triplets were present. Isolated VEs were frequent (8.2%, 45083), VE Couplets were occasional (1.0%, 3085), and VE Triplets were rare (<1.0%, 70). Ventricular Bigeminy and Trigeminy were present      Strips reveiwed and intepreted, agree with above. Please let Mr Thompson know his PVC burden 8% is improved from 14% on prior holter.

## 2024-05-31 ENCOUNTER — HOSPITAL ENCOUNTER (OUTPATIENT)
Dept: NON INVASIVE DIAGNOSTICS | Facility: CLINIC | Age: 74
Discharge: HOME | End: 2024-05-31
Payer: COMMERCIAL

## 2024-05-31 VITALS
SYSTOLIC BLOOD PRESSURE: 98 MMHG | HEART RATE: 70 BPM | DIASTOLIC BLOOD PRESSURE: 50 MMHG | WEIGHT: 160 LBS | HEIGHT: 72 IN | BODY MASS INDEX: 21.67 KG/M2

## 2024-05-31 DIAGNOSIS — I49.3 PVCS (PREMATURE VENTRICULAR CONTRACTIONS): ICD-10-CM

## 2024-05-31 DIAGNOSIS — I47.29 NSVT (NONSUSTAINED VENTRICULAR TACHYCARDIA) (HCC): ICD-10-CM

## 2024-05-31 LAB
AORTIC ROOT: 3.7 CM
BSA FOR ECHO PROCEDURE: 1.94 M2
E WAVE DECELERATION TIME: 236 MS
E/A RATIO: 1.3
FRACTIONAL SHORTENING: 26 % (ref 28–44)
INTERVENTRICULAR SEPTUM IN DIASTOLE (PARASTERNAL SHORT AXIS VIEW): 1.1 CM
INTERVENTRICULAR SEPTUM: 1.1 CM (ref 0.6–1.1)
LAAS-AP2: 15.2 CM2
LAAS-AP4: 20.4 CM2
LEFT ATRIUM SIZE: 3.7 CM
LEFT ATRIUM VOLUME (MOD BIPLANE): 55 ML
LEFT ATRIUM VOLUME INDEX (MOD BIPLANE): 28.4 ML/M2
LEFT INTERNAL DIMENSION IN SYSTOLE: 4.2 CM (ref 2.1–4)
LEFT VENTRICULAR INTERNAL DIMENSION IN DIASTOLE: 5.7 CM (ref 3.5–6)
LEFT VENTRICULAR POSTERIOR WALL IN END DIASTOLE: 1.1 CM
LEFT VENTRICULAR STROKE VOLUME: 96 ML
LV EF: 48 %
LVSV (TEICH): 96 ML
MV E'TISSUE VEL-LAT: 11 CM/S
MV E'TISSUE VEL-SEP: 6 CM/S
MV PEAK A VEL: 0.46 M/S
MV PEAK E VEL: 60 CM/S
MV STENOSIS PRESSURE HALF TIME: 68 MS
MV VALVE AREA P 1/2 METHOD: 3.24
RA PRESSURE ESTIMATED: 3 MMHG
RIGHT ATRIUM AREA SYSTOLE A4C: 13.9 CM2
RIGHT VENTRICLE ID DIMENSION: 4.4 CM
SINOTUBULAR JUNCTION: 3.4 CM
SL CV LEFT ATRIUM LENGTH A2C: 4.5 CM
SL CV LV EF: 50
SL CV PED ECHO LEFT VENTRICLE DIASTOLIC VOLUME (MOD BIPLANE) 2D: 176 ML
SL CV PED ECHO LEFT VENTRICLE SYSTOLIC VOLUME (MOD BIPLANE) 2D: 79 ML
SL CV SINUS OF VALSALVA 2D: 3.8 CM
STJ: 3.4 CM
TRICUSPID ANNULAR PLANE SYSTOLIC EXCURSION: 2.7 CM

## 2024-05-31 PROCEDURE — 93306 TTE W/DOPPLER COMPLETE: CPT

## 2024-05-31 PROCEDURE — 93306 TTE W/DOPPLER COMPLETE: CPT | Performed by: INTERNAL MEDICINE

## 2024-06-13 ENCOUNTER — DOCUMENTATION (OUTPATIENT)
Dept: CARDIOLOGY CLINIC | Facility: CLINIC | Age: 74
End: 2024-06-13

## 2024-06-14 ENCOUNTER — TELEPHONE (OUTPATIENT)
Dept: CARDIOLOGY CLINIC | Facility: CLINIC | Age: 74
End: 2024-06-14

## 2024-06-14 NOTE — TELEPHONE ENCOUNTER
----- Message from Mika Valente MD sent at 6/13/2024 12:29 PM EDT -----    Micheal,    Can you let him know -  genetic testing was negative.    Thanks  MDM

## 2024-06-14 NOTE — TELEPHONE ENCOUNTER
LVOM for pt regarding his negative genetic testing. Pt instructed to call [phone] with any questions or concerns.

## 2024-08-22 ENCOUNTER — RX CHECK (OUTPATIENT)
Dept: URBAN - METROPOLITAN AREA CLINIC 6 | Facility: CLINIC | Age: 74
End: 2024-08-22

## 2024-08-22 DIAGNOSIS — H52.4: ICD-10-CM

## 2024-08-22 PROCEDURE — 92015 DETERMINE REFRACTIVE STATE: CPT

## 2024-08-22 ASSESSMENT — VISUAL ACUITY
OD_CC: 20/40-1
OS_CC: 20/80-1

## 2024-09-12 ENCOUNTER — OFFICE VISIT (OUTPATIENT)
Dept: CARDIOLOGY CLINIC | Facility: CLINIC | Age: 74
End: 2024-09-12
Payer: COMMERCIAL

## 2024-09-12 VITALS
OXYGEN SATURATION: 94 % | BODY MASS INDEX: 21.2 KG/M2 | HEIGHT: 72 IN | HEART RATE: 66 BPM | SYSTOLIC BLOOD PRESSURE: 96 MMHG | DIASTOLIC BLOOD PRESSURE: 60 MMHG | WEIGHT: 156.5 LBS

## 2024-09-12 DIAGNOSIS — I50.32 HEART FAILURE WITH IMPROVED EJECTION FRACTION (HFIMPEF) (HCC): Primary | ICD-10-CM

## 2024-09-12 DIAGNOSIS — I49.3 PVCS (PREMATURE VENTRICULAR CONTRACTIONS): ICD-10-CM

## 2024-09-12 DIAGNOSIS — I34.0 NONRHEUMATIC MITRAL VALVE REGURGITATION: ICD-10-CM

## 2024-09-12 DIAGNOSIS — I50.32 HEART FAILURE WITH IMPROVED EJECTION FRACTION (HFIMPEF) (HCC): ICD-10-CM

## 2024-09-12 DIAGNOSIS — I47.29 NSVT (NONSUSTAINED VENTRICULAR TACHYCARDIA) (HCC): ICD-10-CM

## 2024-09-12 DIAGNOSIS — I42.8 NICM (NONISCHEMIC CARDIOMYOPATHY) (HCC): Primary | ICD-10-CM

## 2024-09-12 DIAGNOSIS — I10 PRIMARY HYPERTENSION: ICD-10-CM

## 2024-09-12 PROCEDURE — 99214 OFFICE O/P EST MOD 30 MIN: CPT | Performed by: INTERNAL MEDICINE

## 2024-09-12 PROCEDURE — G2211 COMPLEX E/M VISIT ADD ON: HCPCS | Performed by: INTERNAL MEDICINE

## 2024-09-12 RX ORDER — DAPAGLIFLOZIN 5 MG/1
5 TABLET, FILM COATED ORAL DAILY
Qty: 90 TABLET | Refills: 3 | Status: SHIPPED | OUTPATIENT
Start: 2024-09-12

## 2024-09-12 NOTE — PROGRESS NOTES
Cardiology and Heart Failure Clinic Note    Eliezer Thompson 74 y.o. male   MRN: [id number]  Encounter: [id number]        Assessment / Plan:    # non-ischemic cardiomyopathy  # HF imp EF    ETIOLOGY:  - most recent EF 45-50% (dx 2001; EF had been much lower in the past).     - non-ischemic.   MRI 2017 -  thin intramyocardial strip of LGE in septum.  - genetic testing - negative    VOLUME:  - not on diuretic  - exam  - euvolemic  - lab monitoring - most recent CMP reviewed    GDMT:  - toprol 75 AM,  50 PM  - entresto  BID  - spironlactone 25 QD  - farxiga 5 QD  - vericiguat (verquvo) 5mg QD  (started by Dr Castellon)    DEVICE:  - Not indicated, EF > 35%      # NSVT / PVCs  Recent zio -->  PVC burden improved to 8%  Not symptomatic  On magnesium  On toprol  Follows with Dr Cox of EP.    Notes indicate he would consider PVC ablation if burden increases or if EF drops going forward.    # HTN  See GDMT above    # Mitral regurgitation  mild MR  Serial follow up      Today's Plan Summary:  See above assessment/plan for full details of today's plan.  Briefly,     No medication changes.              Reason For Visit / Chief Complaint:  F/u - cardiomyopathy    HPI:   Eliezer Thompson is a 74 y.o.  male with history as noted in the problem list and further detailed in the above assessment and plan.    Initial:  May 2024  New patient - requested by Dr Cox for abnormal EF  As above, the patient has a longstanding history of nonischemic cardiomyopathy.  The EF was quite low at time of diagnosis but has subsequently improved and most recently has been about 45%.  Has followed with Dr Castellon who is retiring.  He also has PVCs and NSVT on heart monitor.  Follows with EP for this.  Today, the patient reports  - feels really well.    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  .  Has a boxer mix dog.     Interval:  Plan at initial visit -->   Euvoelmic.  No med changes.  Genetic  testing.    Genetic testing via cardiomyopathy panel has resulted:  Negative    Zio in May  3 brief runs of NSVT  8% PVCs    Echo in May  EF 45-50%.   Mild MR    Today - feeling well.  Wishes he had more muscle mass.  No CP.   No SOB.   No edema.  No syncope.          Cardiac Imaging personally reviewed:  EKG 5-7-24  Sinus bradycardia at 58 bpm.  1st degree AVB.  Possible LAFB.   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   Mild MR       DESTINY    Cardiac MRI 2017  EF 43%  Thin strip of intramyocardial fibrosis of the basal to mid septum.          Stress testing Stress echo 2022  No ischemia    Nuclear stress Oct 2023  Although there are extracardiac artifacts, inferior ischemia cannot be excluded        Coronary CTA or [facility]    RHC    CPET              Patient Active Problem List    Diagnosis Date Noted    Nonrheumatic mitral valve regurgitation 05/16/2024    Decreased cardiac ejection fraction 01/24/2022    Vitamin D deficiency 01/24/2022    NICM (nonischemic cardiomyopathy) ([facility]) 01/18/2021    PVCs (premature ventricular contractions) 01/18/2021    Hypertension 06/21/2017    NSVT (nonsustained ventricular tachycardia) ([facility]) 06/21/2017    Palpitations 06/21/2017    Asthma 06/19/2017    Heart failure with improved ejection fraction (HFimpEF) ([facility]) 06/19/2017    First degree AV block 06/19/2017    Left inguinal hernia 06/13/2016       Past Medical History:   Diagnosis Date    Anesthesia complication     not appropriate for ambulatory surgery center due to bigemeny, bradycardia, and ectopy with hemodynamic sequela    Asthma     Heart failure ([facility])        No Known Allergies    Current Outpatient Medications   Medication Instructions     albuterol (PROVENTIL HFA,VENTOLIN HFA) 90 mcg/act inhaler 1 puff, Every 6 hours PRN    ascorbic acid (VITAMIN C) 500 mg, Oral, Daily    aspirin 81 mg, Oral, Daily    Boswellia-Glucosamine-Vit D (OSTEO BI-FLEX ONE PER DAY PO) Oral    budesonide-formoterol (SYMBICORT) 160-4.5 mcg/act inhaler 1 puff, Daily    Cholecalciferol (VITAMIN D3) 1000 UNITS CAPS Oral    Coenzyme Q10 (CO Q 10) 100 MG CAPS Oral    Farxiga 5 mg, Oral, Daily    fluticasone (FLONASE) 50 mcg/act nasal spray 1 spray, Nasal, Daily PRN    Levocarnitine 500 mg, Oral, Daily    Lumigan 0.01 % ophthalmic drops INSTILL 1 DROP INTO THE RIGHT EYE EVERY EVENING    Magnesium Oxide 400 MG CAPS 1 capsule, Oral, Daily    metoprolol succinate (TOPROL-XL) 50 mg, Oral, 2 times daily, Take 1.5 tabs in the morning and 1 tab in the evening    montelukast (SINGULAIR) 10 mg, Oral, Daily at bedtime    Multiple Vitamin (MULTIVITAMIN) tablet 1 tablet, Oral, Daily    Omega-3 Fatty Acids (FISH OIL PO) 1 capsule, Oral, Daily    sacubitril-valsartan (ENTRESTO)  MG TABS 1 tablet, Oral, 2 times daily    spironolactone (ALDACTONE) 25 mg, Oral, Daily    thiamine (VITAMIN B1) 100 mg, Oral, Daily    Verquvo 5 MG TABS 1 tablet, Oral, Daily       Social History     Socioeconomic History    Marital status:      Spouse name: Not on file    Number of children: Not on file    Years of education: Not on file    Highest education level: Not on file   Occupational History    Not on file   Tobacco Use    Smoking status: Former    Smokeless tobacco: Never    Tobacco comments:     30 years ago   Vaping Use    Vaping status: Never Used   Substance and Sexual Activity    Alcohol use: No    Drug use: No    Sexual activity: Not on file   Other Topics Concern    Not on file   Social History Narrative    Not on file     Social Determinants of Health     Financial Resource Strain: Not on file   Food Insecurity: Not on file   Transportation Needs: Not on file   Physical Activity: Not on file    Stress: Not on file   Social Connections: Not on file   Intimate Partner Violence: Not on file   Housing Stability: Not on file       Family History   Problem Relation Age of Onset    Heart failure Father         in his older age    Heart attack Maternal Grandfather        Physical Exam:  There were no vitals taken for this visit.  There is no height or weight on file to calculate BMI.  Wt Readings from Last 3 Encounters:   05/31/24 72.6 kg (160 lb)   05/16/24 72.9 kg (160 lb 11.2 oz)   05/07/24 73.2 kg (161 lb 4.8 oz)     Physical Exam  Vitals reviewed.   Constitutional:       General: He is not in acute distress.     Appearance: He is not toxic-appearing.   Neck:      Comments: JVP is not elevated  Cardiovascular:      Rate and Rhythm: Normal rate and regular rhythm.      Heart sounds: Murmur (very soft HSM at apex) heard.      No friction rub. No gallop.      Comments: Distant heart sounds  Pulmonary:      Breath sounds: Normal breath sounds. No wheezing, rhonchi or rales.   Musculoskeletal:      Right lower leg: No edema.      Left lower leg: No edema.   Neurological:      Mental Status: He is alert.         Labs & Results:  Lab Results   Component Value Date    SODIUM 139 04/18/2024    K 4.3 04/18/2024     04/18/2024    CO2 27 04/18/2024    BUN 23 04/18/2024    CREATININE 0.81 04/18/2024    GLUC 77 09/28/2022    CALCIUM 9.5 04/18/2024         Thank you for the opportunity to participate in the care of this patient.    Mika Valente MD, [facility]  Advanced Heart Failure and Transplant Cardiologist  Director of Cardio-Oncology  [facility]

## 2024-09-13 ENCOUNTER — FOLLOW UP (OUTPATIENT)
Dept: URBAN - METROPOLITAN AREA CLINIC 6 | Facility: CLINIC | Age: 74
End: 2024-09-13

## 2024-09-13 DIAGNOSIS — H40.1411: ICD-10-CM

## 2024-09-13 DIAGNOSIS — H25.813: ICD-10-CM

## 2024-09-13 DIAGNOSIS — Z98.890: ICD-10-CM

## 2024-09-13 PROCEDURE — 92020 GONIOSCOPY: CPT

## 2024-09-13 PROCEDURE — 92014 COMPRE OPH EXAM EST PT 1/>: CPT

## 2024-09-13 PROCEDURE — 92083 EXTENDED VISUAL FIELD XM: CPT

## 2024-09-13 PROCEDURE — 92133 CPTRZD OPH DX IMG PST SGM ON: CPT

## 2024-09-13 PROCEDURE — 92202 OPSCPY EXTND ON/MAC DRAW: CPT

## 2024-09-13 ASSESSMENT — VISUAL ACUITY
OD_CC: 20/25
OS_CC: 20/25-1

## 2024-09-13 ASSESSMENT — TONOMETRY
OS_IOP_MMHG: 17
OD_IOP_MMHG: 23

## 2024-11-07 ENCOUNTER — OFFICE VISIT (OUTPATIENT)
Dept: CARDIOLOGY CLINIC | Facility: CLINIC | Age: 74
End: 2024-11-07
Payer: COMMERCIAL

## 2024-11-07 VITALS
SYSTOLIC BLOOD PRESSURE: 102 MMHG | BODY MASS INDEX: 21.63 KG/M2 | HEIGHT: 72 IN | WEIGHT: 159.7 LBS | HEART RATE: 60 BPM | DIASTOLIC BLOOD PRESSURE: 58 MMHG

## 2024-11-07 DIAGNOSIS — I47.29 NSVT (NONSUSTAINED VENTRICULAR TACHYCARDIA) (HCC): Primary | ICD-10-CM

## 2024-11-07 PROCEDURE — 99214 OFFICE O/P EST MOD 30 MIN: CPT | Performed by: INTERNAL MEDICINE

## 2024-11-07 PROCEDURE — 93000 ELECTROCARDIOGRAM COMPLETE: CPT | Performed by: INTERNAL MEDICINE

## 2024-11-07 NOTE — PROGRESS NOTES
HEART AND VASCULAR  CARDIAC ELECTROPHYSIOLOGY   HEART RHYTHM CENTER  [facility]    Outpatient  Follow-up  Today's Date: 11/07/24        Patient name: Eliezer Thompson  YOB: 1950  Sex: male         Chief Complaint: f/u PVC      ASSESSMENT:  Problem List Items Addressed This Visit          Cardiovascular and Mediastinum    NSVT (nonsustained ventricular tachycardia) (HCC) - Primary    Relevant Orders    POCT ECG     73 yo male    1) Nonischemic CM EF 15-40% w medical management including entresto and metoprolol 50mg daily. NYHAI-II.    2) Frequent PVC1-8% on zio 2024,  slow NSVT up to 7 beats 150bpm on holter, burden multifocal. SOme appear from Outflow, others from Anterior mitral annulus. Had bigeminy during peak stress on treadmill and did 7 minutes before stopping due to shortness of breath correlating w ectopy. We d/c'd digoxin and increased Metoprolol to 50mg bid. He feels better on this.  Cardiac MRI showed EF 40% and no scar.   Nuclear stress last year nonischemic    EKG today is NSR first degree AV block and nonspecific delay LAD  Mild sinus bg 40-90s on holter on metoprolol  DMT BP is well controlled.     PLAN:    1. Continue metoprolol 50mg bid  2. Stop fish oil. Linked to arrhythmias    F/u 1 year      Orders Placed This Encounter   Procedures    POCT ECG     There are no discontinued medications.        .............................................................................................    HPI/Subjective:   73 yo male    1) Nonischemic CM EF 15-40% w medical management including entresto and metoprolol 50mg daily. NYHAI-II.    2) Frequent PVC1-8% on zio 2024,  slow NSVT up to 7 beats 150bpm on holter, burden multifocal. SOme appear from Outflow, others from Anterior mitral annulus. Had bigeminy during peak stress on treadmill and did 7 minutes before stopping due to shortness of breath correlating w ectopy. We d/c'd digoxin and increased Metoprolol to 50mg bid.  He feels better on this.  Cardiac MRI showed EF 40% and no scar.   Nuclear stress last year nonischemic    EKG today is NSR first degree AV block and nonspecific delay LAD  Mild sinus bg 40-90s on holter on metoprolol  DMT BP is well controlled.         Past Medical History:   Diagnosis Date    Anesthesia complication     not appropriate for ambulatory surgery center due to bigemeny, bradycardia, and ectopy with hemodynamic sequela    Asthma     Heart failure (HCC)        No Known Allergies  I reviewed the Home Medication list and Allergies in the chart.   Scheduled Meds:  Current Outpatient Medications   Medication Sig Dispense Refill    albuterol (PROVENTIL HFA,VENTOLIN HFA) 90 mcg/act inhaler 1 puff every 6 (six) hours as needed for wheezing      ascorbic acid (VITAMIN C) 500 mg tablet Take 500 mg by mouth daily.      aspirin 81 MG tablet Take 81 mg by mouth daily.      Boswellia-Glucosamine-Vit D (OSTEO BI-FLEX ONE PER DAY PO) Take by mouth      budesonide-formoterol (SYMBICORT) 160-4.5 mcg/act inhaler 1 puff daily        Cholecalciferol (VITAMIN D3) 1000 UNITS CAPS Take by mouth.      Coenzyme Q10 (CO Q 10) 100 MG CAPS Take by mouth.      Farxiga 5 MG TABS Take 1 tablet (5 mg total) by mouth daily 90 tablet 3    fluticasone (FLONASE) 50 mcg/act nasal spray 1 spray into each nostril daily as needed for rhinitis      Levocarnitine 250 MG CAPS Take 500 mg by mouth daily      Lumigan 0.01 % ophthalmic drops INSTILL 1 DROP INTO THE RIGHT EYE EVERY EVENING      Magnesium Oxide 400 MG CAPS Take 1 capsule by mouth daily      metoprolol succinate (TOPROL-XL) 50 mg 24 hr tablet Take 50 mg by mouth 2 (two) times a day Take 1.5 tabs in the morning and 1 tab in the evening      montelukast (SINGULAIR) 10 mg tablet Take 10 mg by mouth daily at bedtime.      Multiple Vitamin (MULTIVITAMIN) tablet Take 1 tablet by mouth daily.      Omega-3 Fatty Acids (FISH OIL PO) Take 1 capsule by mouth daily      sacubitril-valsartan  (ENTRESTO)  MG TABS Take 1 tablet by mouth 2 (two) times a day        spironolactone (ALDACTONE) 25 mg tablet Take 25 mg by mouth daily.      thiamine (VITAMIN B1) 100 mg tablet Take 100 mg by mouth daily.      Verquvo 5 MG TABS Take 1 tablet by mouth daily       No current facility-administered medications for this visit.     PRN Meds:.        Family History   Problem Relation Age of Onset    Heart failure Father         in his older age    Heart attack Maternal Grandfather        Social History     Socioeconomic History    Marital status:      Spouse name: Not on file    Number of children: Not on file    Years of education: Not on file    Highest education level: Not on file   Occupational History    Not on file   Tobacco Use    Smoking status: Former    Smokeless tobacco: Never    Tobacco comments:     30 years ago   Vaping Use    Vaping status: Never Used   Substance and Sexual Activity    Alcohol use: No    Drug use: No    Sexual activity: Not on file   Other Topics Concern    Not on file   Social History Narrative    Not on file     Social Determinants of Health     Financial Resource Strain: Not on file   Food Insecurity: Not on file   Transportation Needs: Not on file   Physical Activity: Not on file   Stress: Not on file   Social Connections: Not on file   Intimate Partner Violence: Not on file   Housing Stability: Not on file         OBJECTIVE:    /58 (BP Location: Right arm, Patient Position: Sitting, Cuff Size: Standard)   Pulse 60   Ht 6' (1.829 m)   Wt 72.4 kg (159 lb 11.2 oz)   BMI 21.66 kg/m²   Vitals:    11/07/24 0952   Weight: 72.4 kg (159 lb 11.2 oz)     GEN: No acute distress, Alert and oriented, well appearing  HEENT:Head, neck, ears, oral pharynx: Mucus membranes moist, oral pharynx clear, nares clear. External ears normal  EYES: Pupils equal, sclera anicteric, midline, normal conjuctiva  NECK: No JVD, supple, no obvious masses or thryomegaly or goiter  CARDIOVASCULAR:   "RRR, No murmur, rub, gallops S1,S2  LUNGS: Clear To auscultation bilaterally, normal effort, no rales, rhonchi, crackles  ABDOMEN:  nondistended,  without obvious organomegaly or ascites  EXTREMITIES/VASCULAR:  No edema. Radial pulses intact, pedal pulses difficult to palpate, warm an well perfused.  PSYCH: Normal Affect, no overt suicidal ideation, linear speech pattern without evidence of psychosis.   NEURO: Grossly intact, moving all extremiteis equal, face symmetric, alert and responsive, no obvious focal defecits  GAIT:  Ambulates normally without difficulty  HEME: No bleeding, bruising, petechia, purpura  SKIN: No significant rashes, warm, no diaphoresis or pallor.     Lab Results:       LABS:      Chemistry        Component Value Date/Time     04/24/2015 0714    K 4.3 04/18/2024 0645    K 3.8 04/24/2015 0714     04/18/2024 0645     04/24/2015 0714    CO2 27 04/18/2024 0645    CO2 32 04/24/2015 0714    BUN 23 04/18/2024 0645    BUN 21 04/24/2015 0714    CREATININE 0.81 04/18/2024 0645    CREATININE 0.74 04/24/2015 0714        Component Value Date/Time    CALCIUM 9.5 04/18/2024 0645    CALCIUM 9.0 04/24/2015 0714    ALKPHOS 35 04/18/2024 0645    ALKPHOS 76 04/18/2014 0720    AST 20 04/18/2024 0645    AST 16 04/18/2014 0720    ALT 13 04/18/2024 0645    ALT 23 04/18/2014 0720    BILITOT 0.92 04/18/2014 0720            Lab Results   Component Value Date    CHOL 173 04/18/2014     Lab Results   Component Value Date    HDL 56 04/18/2024    HDL 54 03/20/2023    HDL 40 09/07/2021     Lab Results   Component Value Date    LDLCALC 110 (H) 04/18/2024    LDLCALC 116 (H) 03/20/2023    LDLCALC 93 09/07/2021     Lab Results   Component Value Date    TRIG 62 04/18/2024    TRIG 58 03/20/2023    TRIG 90 09/07/2021     No results found for: \"CHOLHDL\"    IMAGING: No results found.     Cardiac testing:   Results for orders placed in visit on 08/02/18   Echo complete with contrast if indicated     No results found " for this or any previous visit.  No results found for this or any previous visit.  No results found for this or any previous visit.        I reviewed and interpreted the following LABS/EKG/TELE/IMAGING and below is summary of my interpretation (if data available):      Current EKG and Rhythm Strip: NSR, LAFB, septal infarct pattern QRS 118ms

## 2025-01-15 NOTE — PROGRESS NOTES
Cardiology and Heart Failure Clinic Note    Eliezer Thompson 74 y.o. male   MRN: [id number]  Encounter: [id number]        Assessment / Plan:    # non-ischemic cardiomyopathy  # HF imp EF    ETIOLOGY:  - most recent EF 45-50% (dx 2001; EF had been much lower in the past).     - non-ischemic.   MRI 2017 -  thin intramyocardial strip of LGE in septum.  - genetic testing - negative    VOLUME:  - not on diuretic  - exam  - euvolemic  - lab monitoring - check BMP, BNP    GDMT:  - toprol 75 AM,  50 PM  - entresto  BID  - spironlactone 25 QD  - farxiga 5 QD  - vericiguat (verquvo) 5mg QD  (started by Dr Castellon) - can stop when runs out    DEVICE:  - Not indicated, EF > 35%      # NSVT / PVCs  Recent zio -->  PVC burden improved to 8%  Not symptomatic  On magnesium  On toprol  Follows with Dr Cox of EP.    Notes indicate he would consider PVC ablation if burden increases or if EF drops going forward.    # HTN  See GDMT above    # HLD  , mildly elevated  will plan to check ASCVD risk next visit and discuss plan    # Mitral regurgitation  mild MR  Serial follow up      Today's Plan Summary:  See above assessment/plan for full details of today's plan.  Briefly,     Check BMP, BNP              Reason For Visit / Chief Complaint:  F/u - cardiomyopathy    HPI:   Eliezer Thompson is a 74 y.o.  male with history as noted in the problem list and further detailed in the above assessment and plan.    Initial:  May 2024  New patient - requested by Dr Cox for abnormal EF  As above, the patient has a longstanding history of nonischemic cardiomyopathy.  The EF was quite low at time of diagnosis but has subsequently improved and most recently has been about 45%.  Has followed with Dr Castellon who is retiring.  He also has PVCs and NSVT on heart monitor.  Follows with EP for this.  Today, the patient reports  - feels really well.    Retired.   Used to have a "[employer] company.   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  .  Has a  boxer mix dog.     Interval:  Last visit -->  feeling well.  Wishes he had more muscle mass.    Plan last visit -->   No medication changes.    Saw Dr Cox - plan to continue metprolol.     Today  -  had to put his dog down.   Feeling ok.   No CP.  No SOB.   No edema.  No syncope.             Cardiac Imaging personally reviewed:  EKG 5-7-24  Sinus bradycardia at 58 bpm.  1st degree AVB.  Possible LAFB.   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   Mild MR       DESTINY    Cardiac MRI 2017  EF 43%  Thin strip of intramyocardial fibrosis of the basal to mid septum.          Stress testing Stress echo 2022  No ischemia    Nuclear stress Oct 2023  Although there are extracardiac artifacts, inferior ischemia cannot be excluded        Coronary CTA or [facility]    RHC    CPET              Patient Active Problem List    Diagnosis Date Noted   • Nonrheumatic mitral valve regurgitation 05/16/2024   • Decreased cardiac ejection fraction 01/24/2022   • Vitamin D deficiency 01/24/2022   • NICM (nonischemic cardiomyopathy) ([facility]) 01/18/2021   • PVCs (premature ventricular contractions) 01/18/2021   • Hypertension 06/21/2017   • NSVT (nonsustained ventricular tachycardia) ([facility]) 06/21/2017   • Palpitations 06/21/2017   • Asthma 06/19/2017   • Heart failure with improved ejection fraction (HFimpEF) ([facility]) 06/19/2017   • First degree AV block 06/19/2017   • Left inguinal hernia 06/13/2016       Past Medical History:   Diagnosis Date   • Anesthesia complication     not appropriate for ambulatory surgery center due to bigemeny, bradycardia, and ectopy with hemodynamic sequela   • Asthma    • Heart failure ([facility])        No Known Allergies    Current Outpatient  Medications   Medication Instructions   • albuterol (PROVENTIL HFA,VENTOLIN HFA) 90 mcg/act inhaler 1 puff, Every 6 hours PRN   • ascorbic acid (VITAMIN C) 500 mg, Daily   • aspirin 81 mg, Daily   • Boswellia-Glucosamine-Vit D (OSTEO BI-FLEX ONE PER DAY PO) Take by mouth   • budesonide-formoterol (SYMBICORT) 160-4.5 mcg/act inhaler 1 puff, Daily   • Cholecalciferol (VITAMIN D3) 1000 UNITS CAPS Take by mouth.   • Coenzyme Q10 (CO Q 10) 100 MG CAPS Take by mouth.   • Farxiga 5 mg, Oral, Daily   • fluticasone (FLONASE) 50 mcg/act nasal spray 1 spray, Daily PRN   • Levocarnitine 500 mg, Daily   • Lumigan 0.01 % ophthalmic drops INSTILL 1 DROP INTO THE RIGHT EYE EVERY EVENING   • Magnesium Oxide 400 MG CAPS 1 capsule, Daily   • metoprolol succinate (TOPROL-XL) 50 mg, 2 times daily   • montelukast (SINGULAIR) 10 mg, Daily at bedtime   • Multiple Vitamin (MULTIVITAMIN) tablet 1 tablet, Daily   • sacubitril-valsartan (ENTRESTO)  MG TABS 1 tablet, 2 times daily   • spironolactone (ALDACTONE) 25 mg, Daily   • thiamine (VITAMIN B1) 100 mg, Daily   • Verquvo 5 MG TABS 1 tablet, Daily       Social History     Socioeconomic History   • Marital status:      Spouse name: Not on file   • Number of children: Not on file   • Years of education: Not on file   • Highest education level: Not on file   Occupational History   • Not on file   Tobacco Use   • Smoking status: Former   • Smokeless tobacco: Never   • Tobacco comments:     30 years ago   Vaping Use   • Vaping status: Never Used   Substance and Sexual Activity   • Alcohol use: No   • Drug use: No   • Sexual activity: Not on file   Other Topics Concern   • Not on file   Social History Narrative   • Not on file     Social Drivers of Health     Financial Resource Strain: Not on file   Food Insecurity: Not on file   Transportation Needs: Not on file   Physical Activity: Not on file   Stress: Not on file   Social Connections: Not on file   Intimate Partner Violence: Not  on file   Housing Stability: Not on file       Family History   Problem Relation Age of Onset   • Heart failure Father         in his older age   • Heart attack Maternal Grandfather        Physical Exam:  Blood pressure 104/60, height 6' (1.829 m), weight 78 kg (172 lb).  Body mass index is 23.33 kg/m².  Wt Readings from Last 3 Encounters:   01/16/25 78 kg (172 lb)   11/07/24 72.4 kg (159 lb 11.2 oz)   09/12/24 71 kg (156 lb 8 oz)     Physical Exam  Vitals reviewed.   Constitutional:       General: He is not in acute distress.     Appearance: He is not toxic-appearing.   Neck:      Comments: No JVD  Cardiovascular:      Rate and Rhythm: Normal rate and regular rhythm.      Heart sounds: Murmur (very soft HSM at apex) heard.      No friction rub. No gallop.      Comments: Distant heart sounds  Pulmonary:      Breath sounds: Normal breath sounds. No wheezing, rhonchi or rales.   Musculoskeletal:      Right lower leg: No edema.      Left lower leg: No edema.   Neurological:      Mental Status: He is alert.         Labs & Results:  Lab Results   Component Value Date    SODIUM 139 04/18/2024    K 4.3 04/18/2024     04/18/2024    CO2 27 04/18/2024    BUN 23 04/18/2024    CREATININE 0.81 04/18/2024    GLUC 77 09/28/2022    CALCIUM 9.5 04/18/2024         Thank you for the opportunity to participate in the care of this patient.    Mika Valente MD, [facility]  Advanced Heart Failure and Transplant Cardiologist  Director of Cardio-Oncology  [facility]

## 2025-01-16 ENCOUNTER — OFFICE VISIT (OUTPATIENT)
Dept: CARDIOLOGY CLINIC | Facility: CLINIC | Age: 75
End: 2025-01-16
Payer: COMMERCIAL

## 2025-01-16 VITALS
BODY MASS INDEX: 23.3 KG/M2 | HEIGHT: 72 IN | WEIGHT: 172 LBS | SYSTOLIC BLOOD PRESSURE: 104 MMHG | DIASTOLIC BLOOD PRESSURE: 60 MMHG

## 2025-01-16 DIAGNOSIS — I34.0 NONRHEUMATIC MITRAL VALVE REGURGITATION: ICD-10-CM

## 2025-01-16 DIAGNOSIS — I49.3 PVCS (PREMATURE VENTRICULAR CONTRACTIONS): ICD-10-CM

## 2025-01-16 DIAGNOSIS — I49.3 PVC (PREMATURE VENTRICULAR CONTRACTION): ICD-10-CM

## 2025-01-16 DIAGNOSIS — I42.8 NICM (NONISCHEMIC CARDIOMYOPATHY) (HCC): ICD-10-CM

## 2025-01-16 DIAGNOSIS — I50.32 HEART FAILURE WITH IMPROVED EJECTION FRACTION (HFIMPEF) (HCC): Primary | ICD-10-CM

## 2025-01-16 DIAGNOSIS — I10 PRIMARY HYPERTENSION: ICD-10-CM

## 2025-01-16 DIAGNOSIS — I47.29 NSVT (NONSUSTAINED VENTRICULAR TACHYCARDIA) (HCC): ICD-10-CM

## 2025-01-16 PROCEDURE — 99214 OFFICE O/P EST MOD 30 MIN: CPT | Performed by: INTERNAL MEDICINE

## 2025-03-13 ENCOUNTER — 6 MONTH FOLLOW UP (OUTPATIENT)
Dept: URBAN - METROPOLITAN AREA CLINIC 6 | Facility: CLINIC | Age: 75
End: 2025-03-13

## 2025-03-13 DIAGNOSIS — H40.021: ICD-10-CM

## 2025-03-13 DIAGNOSIS — H40.1411: ICD-10-CM

## 2025-03-13 DIAGNOSIS — H25.813: ICD-10-CM

## 2025-03-13 PROCEDURE — 92012 INTRM OPH EXAM EST PATIENT: CPT

## 2025-03-13 ASSESSMENT — TONOMETRY: OD_IOP_MMHG: 31

## 2025-03-13 ASSESSMENT — VISUAL ACUITY
OS_CC: 20/25
OD_CC: 20/20-2
OU_CC: J1

## 2025-04-03 ENCOUNTER — FOLLOW UP (OUTPATIENT)
Dept: URBAN - METROPOLITAN AREA CLINIC 6 | Facility: CLINIC | Age: 75
End: 2025-04-03

## 2025-04-03 DIAGNOSIS — H40.021: ICD-10-CM

## 2025-04-03 DIAGNOSIS — H40.1411: ICD-10-CM

## 2025-04-03 DIAGNOSIS — H25.813: ICD-10-CM

## 2025-04-03 PROCEDURE — 92012 INTRM OPH EXAM EST PATIENT: CPT | Mod: 25

## 2025-04-03 PROCEDURE — 65855 TRABECULOPLASTY LASER SURG: CPT

## 2025-04-03 ASSESSMENT — VISUAL ACUITY
OS_CC: 20/25-1
OD_CC: 20/25

## 2025-04-03 ASSESSMENT — TONOMETRY
OS_IOP_MMHG: 18
OD_IOP_MMHG: 25

## 2025-04-24 ENCOUNTER — FOLLOW UP (OUTPATIENT)
Dept: URBAN - METROPOLITAN AREA CLINIC 6 | Facility: CLINIC | Age: 75
End: 2025-04-24

## 2025-04-24 DIAGNOSIS — H40.021: ICD-10-CM

## 2025-04-24 DIAGNOSIS — H40.1411: ICD-10-CM

## 2025-04-24 PROCEDURE — 92012 INTRM OPH EXAM EST PATIENT: CPT

## 2025-04-24 ASSESSMENT — VISUAL ACUITY
OS_SC: 20/30
OS_CC: 20/25
OD_SC: 20/30
OD_CC: 20/25-1

## 2025-04-24 ASSESSMENT — TONOMETRY
OD_IOP_MMHG: 20
OS_IOP_MMHG: 11

## 2025-05-21 ENCOUNTER — RESULTS FOLLOW-UP (OUTPATIENT)
Age: 75
End: 2025-05-21

## 2025-05-21 ENCOUNTER — APPOINTMENT (OUTPATIENT)
Dept: LAB | Age: 75
End: 2025-05-21
Attending: INTERNAL MEDICINE
Payer: COMMERCIAL

## 2025-05-21 DIAGNOSIS — N40.1 ENLARGED PROSTATE WITH URINARY OBSTRUCTION: ICD-10-CM

## 2025-05-21 DIAGNOSIS — I34.0 NONRHEUMATIC MITRAL VALVE REGURGITATION: ICD-10-CM

## 2025-05-21 DIAGNOSIS — I10 PRIMARY HYPERTENSION: ICD-10-CM

## 2025-05-21 DIAGNOSIS — I50.32 HEART FAILURE WITH IMPROVED EJECTION FRACTION (HFIMPEF) (HCC): ICD-10-CM

## 2025-05-21 DIAGNOSIS — N13.8 ENLARGED PROSTATE WITH URINARY OBSTRUCTION: ICD-10-CM

## 2025-05-21 DIAGNOSIS — I42.8 NICM (NONISCHEMIC CARDIOMYOPATHY) (HCC): ICD-10-CM

## 2025-05-21 DIAGNOSIS — I47.29 NSVT (NONSUSTAINED VENTRICULAR TACHYCARDIA) (HCC): ICD-10-CM

## 2025-05-21 DIAGNOSIS — I49.3 PVCS (PREMATURE VENTRICULAR CONTRACTIONS): ICD-10-CM

## 2025-05-21 LAB
ANION GAP SERPL CALCULATED.3IONS-SCNC: 7 MMOL/L (ref 4–13)
BNP SERPL-MCNC: 337 PG/ML (ref 0–100)
BUN SERPL-MCNC: 26 MG/DL (ref 5–25)
CALCIUM SERPL-MCNC: 9.4 MG/DL (ref 8.4–10.2)
CHLORIDE SERPL-SCNC: 106 MMOL/L (ref 96–108)
CO2 SERPL-SCNC: 28 MMOL/L (ref 21–32)
CREAT SERPL-MCNC: 0.83 MG/DL (ref 0.6–1.3)
GFR SERPL CREATININE-BSD FRML MDRD: 87 ML/MIN/1.73SQ M
GLUCOSE P FAST SERPL-MCNC: 89 MG/DL (ref 65–99)
POTASSIUM SERPL-SCNC: 4.7 MMOL/L (ref 3.5–5.3)
PSA SERPL-MCNC: 0.46 NG/ML (ref 0–4)
SODIUM SERPL-SCNC: 141 MMOL/L (ref 135–147)

## 2025-05-21 PROCEDURE — 36415 COLL VENOUS BLD VENIPUNCTURE: CPT

## 2025-05-21 PROCEDURE — 80048 BASIC METABOLIC PNL TOTAL CA: CPT

## 2025-05-21 PROCEDURE — 84153 ASSAY OF PSA TOTAL: CPT

## 2025-05-21 PROCEDURE — 83880 ASSAY OF NATRIURETIC PEPTIDE: CPT

## 2025-05-27 NOTE — PROGRESS NOTES
Cardiology and Heart Failure Clinic Note    Eliezer Thompson 75 y.o. male   MRN: [id number]  Encounter: [id number]        Assessment / Plan:    # non-ischemic cardiomyopathy  # HF imp EF    ETIOLOGY:  - most recent EF 45-50% (dx 2001; EF had been much lower in the past).     - non-ischemic.   MRI 2017 -  thin intramyocardial strip of LGE in septum.  - genetic testing - negative    VOLUME:  - not on diuretic  - recent baseline BNP -  337    - exam  - euvolemic  - lab monitoring -  recent BMP stable    GDMT:  - toprol 75 AM,  50 PM  - entresto  BID  - spironlactone 25 QD  - farxiga 5 QD    DEVICE:  - Not indicated, EF > 35%      # NSVT / PVCs  Last zio -->  PVC burden improved to 8%  Not symptomatic  On magnesium  On toprol  Follows with Dr Cox of EP.    Notes indicate he would consider PVC ablation if burden increases or if EF drops going forward.    Will update echo before his next visit with Dr Cox.    # HTN  See GDMT above    # HLD  , mildly elevated  ASCVD risk score 16%.  Start lipitor 20.      # Mitral regurgitation  mild MR  Serial follow up      Today's Plan Summary:  See above assessment/plan for full details of today's plan.  Briefly,     Update echocardiogram  Start Lipitor given high ASCVD risk                  Reason For Visit / Chief Complaint:  F/u - cardiomyopathy    HPI:   Eliezer Thompson is a 75 y.o.  male with history as noted in the problem list and further detailed in the above assessment and plan.    Initial:  May 2024  New patient - requested by Dr Cox for abnormal EF  As above, the patient has a longstanding history of nonischemic cardiomyopathy.  The EF was quite low at time of diagnosis but has subsequently improved and most recently has been about 45%.  Has followed with Dr Castellon who is retiring.  He also has PVCs and NSVT on heart monitor.  Follows with EP for this.  Today, the patient reports  - feels really well.    Retired.   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  .  Has a boxer mix dog.     Interval:  Last visit -->   had to put his dog down.   Feeling ok.     Plan last visit -->   Check BMP, BNP    BMP -   stable  BNP -   337   (no priors)      Today  - more active lately.  Walking much more.  Got a chi retriever puppy.  No CP or SOB.  No edema.  No palpitations.               Cardiac Imaging personally reviewed:  EKG 5-7-24  Sinus bradycardia at 58 bpm.  1st degree AVB.  Possible LAFB.   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   Mild MR       DESTINY    Cardiac MRI 2017  EF 43%  Thin strip of intramyocardial fibrosis of the basal to mid septum.          Stress testing Stress echo 2022  No ischemia    Nuclear stress Oct 2023  Although there are extracardiac artifacts, inferior ischemia cannot be excluded        Coronary CTA or [facility]    RHC    CPET              Patient Active Problem List    Diagnosis Date Noted   • Mixed hyperlipidemia 05/29/2025   • Nonrheumatic mitral valve regurgitation 05/16/2024   • Decreased cardiac ejection fraction 01/24/2022   • Vitamin D deficiency 01/24/2022   • NICM (nonischemic cardiomyopathy) ([facility]) 01/18/2021   • PVCs (premature ventricular contractions) 01/18/2021   • Hypertension 06/21/2017   • NSVT (nonsustained ventricular tachycardia) ([facility]) 06/21/2017   • Palpitations 06/21/2017   • Asthma 06/19/2017   • Heart failure with improved ejection fraction (HFimpEF) ([facility]) 06/19/2017   • First degree AV block 06/19/2017   • Left inguinal hernia 06/13/2016       Past Medical History:   Diagnosis Date   • Anesthesia complication     not appropriate for ambulatory surgery center due to bigemeny, bradycardia, and ectopy with hemodynamic  sequela   • Asthma    • Heart failure (HCC)        No Known Allergies    Current Outpatient Medications   Medication Instructions   • albuterol (PROVENTIL HFA,VENTOLIN HFA) 90 mcg/act inhaler 1 puff, Every 6 hours PRN   • ascorbic acid (VITAMIN C) 500 mg, Daily   • aspirin 81 mg, Daily   • atorvastatin (LIPITOR) 20 mg, Oral, Daily at bedtime   • Boswellia-Glucosamine-Vit D (OSTEO BI-FLEX ONE PER DAY PO) Take by mouth   • budesonide-formoterol (SYMBICORT) 160-4.5 mcg/act inhaler 1 puff, Daily   • Cholecalciferol (VITAMIN D3) 1000 UNITS CAPS Take by mouth   • Coenzyme Q10 (CO Q 10) 100 MG CAPS Take by mouth   • Farxiga 5 mg, Oral, Daily   • fluticasone (FLONASE) 50 mcg/act nasal spray 1 spray, Daily PRN   • Gluc-Chonn-MSM-Boswellia-Vit D (GLUCOSAMINE CHONDROITIN + D3 PO) Oral   • Levocarnitine 500 mg, Daily   • Lumigan 0.01 % ophthalmic drops    • Magnesium Oxide 400 MG CAPS 1 capsule, Daily   • metoprolol succinate (TOPROL-XL) 50 mg, 2 times daily   • montelukast (SINGULAIR) 10 mg, Daily at bedtime   • Multiple Vitamin (MULTIVITAMIN) tablet 1 tablet, Daily   • sacubitril-valsartan (ENTRESTO)  MG TABS 1 tablet, 2 times daily   • spironolactone (ALDACTONE) 25 mg, Daily   • thiamine (VITAMIN B1) 100 mg, Daily       Social History     Socioeconomic History   • Marital status:      Spouse name: Not on file   • Number of children: Not on file   • Years of education: Not on file   • Highest education level: Not on file   Occupational History   • Not on file   Tobacco Use   • Smoking status: Former   • Smokeless tobacco: Never   • Tobacco comments:     30 years ago   Vaping Use   • Vaping status: Never Used   Substance and Sexual Activity   • Alcohol use: No   • Drug use: No   • Sexual activity: Not on file   Other Topics Concern   • Not on file   Social History Narrative   • Not on file     Social Drivers of Health     Financial Resource Strain: Not on file   Food Insecurity: Not on file   Transportation Needs:  Not on file   Physical Activity: Not on file   Stress: Not on file   Social Connections: Not on file   Intimate Partner Violence: Not on file   Housing Stability: Not on file       Family History   Problem Relation Name Age of Onset   • Heart failure Father          in his older age   • Heart attack Maternal Grandfather         Physical Exam:  Blood pressure 106/64, pulse 63, height 6' (1.829 m), weight 76 kg (167 lb 8 oz), SpO2 94%.  Body mass index is 22.72 kg/m².  Wt Readings from Last 3 Encounters:   05/29/25 76 kg (167 lb 8 oz)   01/16/25 78 kg (172 lb)   11/07/24 72.4 kg (159 lb 11.2 oz)     Physical Exam  Vitals reviewed.   Constitutional:       General: He is not in acute distress.     Appearance: He is not toxic-appearing.   Neck:      Comments: No JVP elevation  Cardiovascular:      Rate and Rhythm: Normal rate and regular rhythm.      Heart sounds: Murmur (very soft HSM at apex) heard.      No friction rub. No gallop.      Comments: Distant heart sounds  Pulmonary:      Breath sounds: Normal breath sounds. No wheezing, rhonchi or rales.     Musculoskeletal:      Right lower leg: No edema.      Left lower leg: No edema.     Neurological:      Mental Status: He is alert.         Labs & Results:  Lab Results   Component Value Date    SODIUM 141 05/21/2025    K 4.7 05/21/2025     05/21/2025    CO2 28 05/21/2025    BUN 26 (H) 05/21/2025    CREATININE 0.83 05/21/2025    GLUC 77 09/28/2022    CALCIUM 9.4 05/21/2025         Thank you for the opportunity to participate in the care of this patient.    Mika Valente MD, [facility]  Advanced Heart Failure and Transplant Cardiologist  Director of Cardio-Oncology  [facility]

## 2025-05-29 ENCOUNTER — OFFICE VISIT (OUTPATIENT)
Dept: CARDIOLOGY CLINIC | Facility: CLINIC | Age: 75
End: 2025-05-29
Payer: COMMERCIAL

## 2025-05-29 VITALS
OXYGEN SATURATION: 94 % | SYSTOLIC BLOOD PRESSURE: 106 MMHG | WEIGHT: 167.5 LBS | DIASTOLIC BLOOD PRESSURE: 64 MMHG | HEART RATE: 63 BPM | BODY MASS INDEX: 22.69 KG/M2 | HEIGHT: 72 IN

## 2025-05-29 DIAGNOSIS — I47.29 NSVT (NONSUSTAINED VENTRICULAR TACHYCARDIA) (HCC): ICD-10-CM

## 2025-05-29 DIAGNOSIS — I49.3 PVCS (PREMATURE VENTRICULAR CONTRACTIONS): ICD-10-CM

## 2025-05-29 DIAGNOSIS — I34.0 NONRHEUMATIC MITRAL VALVE REGURGITATION: ICD-10-CM

## 2025-05-29 DIAGNOSIS — E78.2 MIXED HYPERLIPIDEMIA: ICD-10-CM

## 2025-05-29 DIAGNOSIS — I42.8 NICM (NONISCHEMIC CARDIOMYOPATHY) (HCC): ICD-10-CM

## 2025-05-29 DIAGNOSIS — I10 PRIMARY HYPERTENSION: ICD-10-CM

## 2025-05-29 DIAGNOSIS — I50.32 HEART FAILURE WITH IMPROVED EJECTION FRACTION (HFIMPEF) (HCC): Primary | ICD-10-CM

## 2025-05-29 PROCEDURE — 99214 OFFICE O/P EST MOD 30 MIN: CPT | Performed by: INTERNAL MEDICINE

## 2025-05-29 PROCEDURE — G2211 COMPLEX E/M VISIT ADD ON: HCPCS | Performed by: INTERNAL MEDICINE

## 2025-05-29 RX ORDER — ATORVASTATIN CALCIUM 20 MG/1
20 TABLET, FILM COATED ORAL
Qty: 90 TABLET | Refills: 3 | Status: SHIPPED | OUTPATIENT
Start: 2025-05-29

## 2025-08-21 ENCOUNTER — IOP CHECK (OUTPATIENT)
Dept: URBAN - METROPOLITAN AREA CLINIC 6 | Facility: CLINIC | Age: 75
End: 2025-08-21

## 2025-08-21 DIAGNOSIS — H25.813: ICD-10-CM

## 2025-08-21 DIAGNOSIS — H40.021: ICD-10-CM

## 2025-08-21 DIAGNOSIS — H40.1411: ICD-10-CM

## 2025-08-21 PROCEDURE — 92012 INTRM OPH EXAM EST PATIENT: CPT

## 2025-08-21 PROCEDURE — 92083 EXTENDED VISUAL FIELD XM: CPT

## 2025-08-21 PROCEDURE — 92020 GONIOSCOPY: CPT

## 2025-08-21 ASSESSMENT — VISUAL ACUITY
OS_CC: 20/25-2
OD_CC: 20/25-2

## 2025-08-21 ASSESSMENT — TONOMETRY
OD_IOP_MMHG: 17
OS_IOP_MMHG: 10

## (undated) DEVICE — INTENDED FOR TISSUE SEPARATION, AND OTHER PROCEDURES THAT REQUIRE A SHARP SURGICAL BLADE TO PUNCTURE OR CUT.: Brand: BARD-PARKER SAFETY BLADES SIZE 15, STERILE

## (undated) DEVICE — UNDYED BRAIDED (POLYGLACTIN 910), SYNTHETIC ABSORBABLE SUTURE: Brand: COATED VICRYL

## (undated) DEVICE — SUT PDS II 3-0 SH 27 IN Z316H

## (undated) DEVICE — GLOVE SRG BIOGEL ECLIPSE 7

## (undated) DEVICE — SUT VICRYL 1 CT-1 27 IN J261H

## (undated) DEVICE — TOWEL SURG XR DETECT GREEN STRL RFD

## (undated) DEVICE — ADHESIVE SKIN HIGH VISCOSITY EXOFIN 1ML

## (undated) DEVICE — PAD GROUNDING ADULT

## (undated) DEVICE — SPONGE STICK WITH PVP-I: Brand: KENDALL

## (undated) DEVICE — DRAPE EQUIPMENT RF WAND

## (undated) DEVICE — NEEDLE 22 G X 1 1/2 SAFETY

## (undated) DEVICE — SUT VICRYL 2-0 REEL 54 IN J286G

## (undated) DEVICE — BETHLEHEM UNIVERSAL MINOR GEN: Brand: CARDINAL HEALTH

## (undated) DEVICE — SUT MONOCRYL 4-0 PS-2 18 IN Y496G

## (undated) DEVICE — SUT VICRYL 3-0 SH 27 IN J416H

## (undated) DEVICE — VIAL DECANTER

## (undated) DEVICE — PENCIL ELECTROSURG E-Z CLEAN -0035H

## (undated) RX ORDER — BIMATOPROST 0.1 MG/ML: 1 SOLUTION/ DROPS OPHTHALMIC EVERY EVENING